# Patient Record
Sex: FEMALE | Race: WHITE | Employment: OTHER | RURAL
[De-identification: names, ages, dates, MRNs, and addresses within clinical notes are randomized per-mention and may not be internally consistent; named-entity substitution may affect disease eponyms.]

---

## 2021-06-17 LAB — MAMMOGRAPHY, EXTERNAL: NORMAL

## 2022-04-14 ENCOUNTER — OFFICE VISIT (OUTPATIENT)
Dept: FAMILY MEDICINE CLINIC | Age: 57
End: 2022-04-14
Payer: OTHER GOVERNMENT

## 2022-04-14 VITALS
HEART RATE: 78 BPM | RESPIRATION RATE: 20 BRPM | SYSTOLIC BLOOD PRESSURE: 132 MMHG | BODY MASS INDEX: 29.09 KG/M2 | TEMPERATURE: 97.8 F | WEIGHT: 181 LBS | HEIGHT: 66 IN | OXYGEN SATURATION: 97 % | DIASTOLIC BLOOD PRESSURE: 78 MMHG

## 2022-04-14 DIAGNOSIS — G89.29 OTHER CHRONIC PAIN: ICD-10-CM

## 2022-04-14 DIAGNOSIS — E03.9 ACQUIRED HYPOTHYROIDISM: ICD-10-CM

## 2022-04-14 DIAGNOSIS — L08.9 INFECTION OF GREAT TOE: ICD-10-CM

## 2022-04-14 DIAGNOSIS — E11.65 TYPE 2 DIABETES MELLITUS WITH HYPERGLYCEMIA, WITHOUT LONG-TERM CURRENT USE OF INSULIN (HCC): Primary | ICD-10-CM

## 2022-04-14 DIAGNOSIS — Z13.220 SCREENING FOR HYPERLIPIDEMIA: ICD-10-CM

## 2022-04-14 DIAGNOSIS — Z11.59 ENCOUNTER FOR HEPATITIS C SCREENING TEST FOR LOW RISK PATIENT: ICD-10-CM

## 2022-04-14 DIAGNOSIS — Z76.89 ESTABLISHING CARE WITH NEW DOCTOR, ENCOUNTER FOR: ICD-10-CM

## 2022-04-14 DIAGNOSIS — E55.9 VITAMIN D DEFICIENCY: ICD-10-CM

## 2022-04-14 PROBLEM — I83.12 VARICOSE VEINS OF LEFT LOWER EXTREMITY WITH INFLAMMATION: Status: ACTIVE | Noted: 2017-11-08

## 2022-04-14 PROBLEM — N39.3 STRESS INCONTINENCE: Status: ACTIVE | Noted: 2019-05-07

## 2022-04-14 PROBLEM — G89.4 CHRONIC PAIN DISORDER: Status: ACTIVE | Noted: 2022-04-14

## 2022-04-14 PROBLEM — L03.031 CELLULITIS OF THIRD TOE OF RIGHT FOOT: Status: ACTIVE | Noted: 2019-08-13

## 2022-04-14 PROBLEM — M95.8 DEFORMITY OF CLAVICLE: Status: ACTIVE | Noted: 2017-10-26

## 2022-04-14 PROBLEM — F11.20 NARCOTIC DEPENDENCE (HCC): Status: ACTIVE | Noted: 2021-04-30

## 2022-04-14 PROBLEM — E78.00 HYPERCHOLESTEROLEMIA: Status: ACTIVE | Noted: 2022-04-14

## 2022-04-14 PROCEDURE — 99204 OFFICE O/P NEW MOD 45 MIN: CPT | Performed by: INTERNAL MEDICINE

## 2022-04-14 RX ORDER — LEVOTHYROXINE SODIUM 75 UG/1
1 TABLET ORAL DAILY
COMMUNITY
Start: 2022-02-19

## 2022-04-14 RX ORDER — NAPROXEN 250 MG/1
250 TABLET ORAL
COMMUNITY

## 2022-04-14 RX ORDER — METFORMIN HYDROCHLORIDE 500 MG/1
1000 TABLET ORAL 2 TIMES DAILY
COMMUNITY
Start: 2021-12-01

## 2022-04-14 RX ORDER — GABAPENTIN 100 MG/1
400 CAPSULE ORAL
COMMUNITY

## 2022-04-14 RX ORDER — TRAZODONE HYDROCHLORIDE 50 MG/1
50 TABLET ORAL AT BEDTIME
COMMUNITY
End: 2022-05-09

## 2022-04-14 RX ORDER — OXYCODONE HYDROCHLORIDE 5 MG/1
10 TABLET ORAL 4 TIMES DAILY
COMMUNITY

## 2022-04-14 RX ORDER — LIDOCAINE 50 MG/G
3 PATCH TOPICAL DAILY
COMMUNITY

## 2022-04-14 RX ORDER — ONDANSETRON 4 MG/1
4 TABLET, FILM COATED ORAL
COMMUNITY

## 2022-04-14 RX ORDER — POLYETHYLENE GLYCOL 3350 17 G/17G
17 POWDER, FOR SOLUTION ORAL
COMMUNITY

## 2022-04-14 RX ORDER — ESTRADIOL 2 MG/1
1 TABLET ORAL DAILY
COMMUNITY
Start: 2022-02-19

## 2022-04-14 RX ORDER — ACETAMINOPHEN 325 MG/1
650 TABLET ORAL
COMMUNITY
End: 2022-05-09

## 2022-04-14 RX ORDER — CYCLOBENZAPRINE HCL 10 MG
TABLET ORAL
COMMUNITY
Start: 2021-10-25

## 2022-04-14 RX ORDER — LISINOPRIL 10 MG/1
1 TABLET ORAL DAILY
COMMUNITY
Start: 2022-02-19

## 2022-04-14 RX ORDER — GABAPENTIN 300 MG/1
300 CAPSULE ORAL
COMMUNITY
End: 2022-05-09

## 2022-04-14 NOTE — PATIENT INSTRUCTIONS
Varicose Veins: Care Instructions  Your Care Instructions  Varicose veins are twisted, enlarged veins near the surface of the skin. They develop most often in the legs and ankles. Some people may be more likely than others to get varicose veins because of aging or hormone changes or because a parent has them. Being overweight or pregnant can make varicose veins worse. Jobs that require standing for long periods of time also can make them worse. Follow-up care is a key part of your treatment and safety. Be sure to make and go to all appointments, and call your doctor if you are having problems. It's also a good idea to know your test results and keep a list of the medicines you take. How can you care for yourself at home? · Wear compression stockings during the day to help relieve symptoms. They improve blood flow and are the main treatment for varicose veins. Talk to your doctor about which ones to get and where to get them. · Prop up your legs at or above the level of your heart when possible. This helps keep the blood from pooling in your lower legs and improves blood flow to the rest of your body. · Avoid sitting and standing for long periods. This puts added stress on your veins. · Get regular exercise, and control your weight. Walk, bicycle, or swim to improve blood flow in your legs. · If you bump your leg so hard that you know it is likely to bruise, prop up your leg and put ice or a cold pack on the area for 10 to 20 minutes at a time. Try to do this every 1 to 2 hours for the next 3 days (when you are awake) or until the swelling goes down. Put a thin cloth between the ice and your skin. · If you cut or scratch the skin over a vein, it may bleed a lot. Prop up your leg and apply firm pressure with a clean bandage over the site of the bleeding. Continue to apply pressure for a full 15 minutes. Do not check sooner to see if the bleeding has stopped.  If the bleeding has not stopped after 15 minutes, apply pressure again for another 15 minutes. You can repeat this up to 3 times for a total of 45 minutes. If you have a blood clot in a varicose vein, you may have tenderness and swelling over the vein. The vein may feel firm. Be sure to call your doctor right away if you have these symptoms. If your doctor has told you how to care for the clot, follow his or her instructions. Care may include the following:  · Prop up your leg and apply heat with a warm, damp cloth or a heating pad set on low (put a towel or cloth between your leg and the heating pad to prevent burns). · Ask your doctor if you can take an over-the-counter pain medicine, such as acetaminophen (Tylenol), ibuprofen (Advil, Motrin), or naproxen (Aleve). Be safe with medicines. Read and follow all instructions on the label. When should you call for help? Call 911 anytime you think you may need emergency care. For example, call if:    · You have sudden chest pain and shortness of breath, or you cough up blood. Call your doctor now or seek immediate medical care if:    · You have signs of a blood clot, such as:  ? Pain in your calf, back of the knee, thigh, or groin. ? Redness and swelling in your leg or groin.     · A varicose vein begins to bleed and you cannot stop it.     · You have a tender lump in your leg.     · You get an open sore. Watch closely for changes in your health, and be sure to contact your doctor if:    · Your varicose vein symptoms do not improve with home treatment. Where can you learn more? Go to http://www.gray.com/  Enter B637 in the search box to learn more about \"Varicose Veins: Care Instructions. \"  Current as of: July 6, 2021               Content Version: 13.2  © 2006-2022 [employer]. Care instructions adapted under license by [employer] (which disclaims liability or warranty for this information).  If you have questions about a medical condition or this instruction, always ask your healthcare professional. [place] [address] any warranty or liability for your use of this information.

## 2022-04-14 NOTE — PROGRESS NOTES
CHIEF COMPLAINT:   Chief Complaint   Patient presents with    New Patient     est care- Dr Herson Britt- no longer take     Pain (Chronic)     pain managment referral for back    Toe Pain     Rt big toe- had surgery Nov 8 hammer toe       IMPRESSION AND PLAN:   Diagnoses and all orders for this visit:    1. Type 2 diabetes mellitus with hyperglycemia, without long-term current use of insulin (HCC)  -     HEMOGLOBIN A1C WITH EAG; Future  -     MICROALBUMIN, UR, RAND W/ MICROALB/CREAT RATIO; Future    2. Acquired hypothyroidism  -     TSH 3RD GENERATION; Future  -     T4 (THYROXINE); Future    3. Screening for hyperlipidemia  -     METABOLIC PANEL, COMPREHENSIVE; Future  -     CBC WITH AUTOMATED DIFF; Future  -     LIPID PANEL; Future    4. Encounter for hepatitis C screening test for low risk patient  -     HEPATITIS C AB; Future    5. Other chronic pain  -     REFERRAL TO PAIN MANAGEMENT    6. Vitamin D deficiency  -     VITAMIN D, 25 HYDROXY; Future    7. Establishing care with new doctor, encounter for    8. Infection of great toe  -     REFERRAL TO ORTHOPEDICS    I have discussed the diagnosis with the patient and the intended treatment plan as seen in the above orders. The patient has received an after-visit summary and questions were answered concerning future plans. Asked to return should symptoms worsen or not improve with treatment. Any pending labs and studies will be relayed to patient when they become available. Pt verbalizes understanding of plan of care and denies further questions or concerns at this time. Follow-up and Dispositions    · Return in about 3 weeks (around 5/5/2022), or if symptoms worsen or fail to improve. · Well woman evaluation. Should have labs done prior. HISTORY OF PRESENT ILLNESS:   57F who presents as a NEW patient to [facility].. She has a h/o chronic back pain and recently had surgery of a Great Toe Hammer toe in November 2021.  She had been under the care of Dr. Ashley Blood who no longer takes . The patient is also managed by Dr. German Ordonez (Pain management) and sees him every 2-months. She does need a referral to continue to see him. He manages her pain medications. The patient also has a h/o T2DM, HTN and HLD. She also recently had an infection of her great right toe and has been seen by Dr. Prince Guerin. She needs a referral for this as well. We reviewed her HM and she will follow up for a well woman visit and to update her HM. PAST MEDICAL HISTORY:  Past Medical History:   Diagnosis Date    Chronic pain     back    Diabetes ([address]     Hypercholesterolemia     Hypertension     Thyroid disease        PAST SURGICAL HISTORY:  Past Surgical History:   Procedure Laterality Date    HX APPENDECTOMY      HX CARPAL TUNNEL RELEASE Bilateral     HX HAMMER TOE REPAIR  11/2021    HX HYSTERECTOMY  1992    HX ORTHOPAEDIC      back    IR CHOLECYSTOSTOMY PERCUTANEOUS         MEDICATIONS:  Current Outpatient Medications   Medication Sig Dispense Refill    acetaminophen (TYLENOL) 325 mg tablet Take 650 mg by mouth every six (6) hours as needed.  cyclobenzaprine (FLEXERIL) 10 mg tablet TAKE 1 TABLET BY MOUTH AT BEDTIME AS NEEDED FOR MUSCLE CRAMPS      empagliflozin (Jardiance) 10 mg tablet 1 Tablet daily.  estradioL (ESTRACE) 2 mg tablet 1 Tablet daily.  gabapentin (NEURONTIN) 100 mg capsule Take 400 mg by mouth nightly.  gabapentin (NEURONTIN) 300 mg capsule Take 300 mg by mouth three (3) times daily as needed.  levothyroxine (SYNTHROID) 75 mcg tablet 1 Tablet daily.  lidocaine (LIDODERM) 5 % 2 Patches by TransDERmal route daily.  lisinopriL (PRINIVIL, ZESTRIL) 10 mg tablet 1 Tablet daily.  metFORMIN (GLUCOPHAGE) 500 mg tablet Take 1,000 mg by mouth two (2) times a day.  naproxen (NAPROSYN) 250 mg tablet Take 220 mg by mouth two (2) times daily as needed.       ondansetron hcl (ZOFRAN) 4 mg tablet Take 4 mg by mouth three (3) times daily as needed.  oxyCODONE IR (ROXICODONE) 5 mg immediate release tablet Take 10 mg by mouth two (2) times a day.  polyethylene glycol (MIRALAX) 17 gram/dose powder Take 17 g by mouth nightly.  traZODone (DESYREL) 50 mg tablet Take 50 mg by mouth At bedtime.  vitamin C-multivitamin-mineral 500 mg chew Take  by mouth daily. ALLERGIES:  Allergies   Allergen Reactions    Codeine Other (comments)     Tachycardia and passed out    Tramadol Itching    Phenazopyridine Unknown (comments)        SOCIAL HISTORY:   Social History     Tobacco Use    Smoking status: Former Smoker     Packs/day: 1.00     Years: 20.00     Pack years: 20.00     Types: Cigarettes     Quit date:      Years since quittin.2    Smokeless tobacco: Never Used   Substance Use Topics    Alcohol use: Not Currently    Drug use: Not Currently       FAMILY HISTORY:   No family history on file. REVIEW OF SYSTEMS:  Review of Systems - Negative except for documented in the HPI. PHYSICAL EXAMINATION:  Visit Vitals  /78 (BP 1 Location: Right arm, BP Patient Position: Sitting, BP Cuff Size: Adult)   Pulse 78   Temp 97.8 °F (36.6 °C) (Temporal)   Resp 20   Ht 5' 6\" (1.676 m)   Wt 181 lb (82.1 kg)   SpO2 97%   BMI 29.21 kg/m²     General appearance: alert, well appearing, and in no distress. Chest: clear to auscultation, no wheezes, rales or rhonchi, symmetric air entry. CVS exam: normal rate, regular rhythm, normal S1, S2, no murmurs, rubs, clicks or gallops. Oropharyngeal exam - mucous membranes moist, pharynx normal without lesions. Exam of extremities: bilateral varicose veins. Mild swelling and tenderness around R-great toe. Previous hammertoe surgery healed scars noted. Skin exam - normal coloration and turgor, no rashes, no suspicious skin lesions noted.       LABORATORY DATA:  Ordered/Pending      Patient Instructions          Varicose Veins: Care Instructions  Your Care Instructions  Varicose veins are twisted, enlarged veins near the surface of the skin. They develop most often in the legs and ankles. Some people may be more likely than others to get varicose veins because of aging or hormone changes or because a parent has them. Being overweight or pregnant can make varicose veins worse. Jobs that require standing for long periods of time also can make them worse. Follow-up care is a key part of your treatment and safety. Be sure to make and go to all appointments, and call your doctor if you are having problems. It's also a good idea to know your test results and keep a list of the medicines you take. How can you care for yourself at home? · Wear compression stockings during the day to help relieve symptoms. They improve blood flow and are the main treatment for varicose veins. Talk to your doctor about which ones to get and where to get them. · Prop up your legs at or above the level of your heart when possible. This helps keep the blood from pooling in your lower legs and improves blood flow to the rest of your body. · Avoid sitting and standing for long periods. This puts added stress on your veins. · Get regular exercise, and control your weight. Walk, bicycle, or swim to improve blood flow in your legs. · If you bump your leg so hard that you know it is likely to bruise, prop up your leg and put ice or a cold pack on the area for 10 to 20 minutes at a time. Try to do this every 1 to 2 hours for the next 3 days (when you are awake) or until the swelling goes down. Put a thin cloth between the ice and your skin. · If you cut or scratch the skin over a vein, it may bleed a lot. Prop up your leg and apply firm pressure with a clean bandage over the site of the bleeding. Continue to apply pressure for a full 15 minutes. Do not check sooner to see if the bleeding has stopped.  If the bleeding has not stopped after 15 minutes, apply pressure again for another 15 minutes. You can repeat this up to 3 times for a total of 45 minutes. If you have a blood clot in a varicose vein, you may have tenderness and swelling over the vein. The vein may feel firm. Be sure to call your doctor right away if you have these symptoms. If your doctor has told you how to care for the clot, follow his or her instructions. Care may include the following:  · Prop up your leg and apply heat with a warm, damp cloth or a heating pad set on low (put a towel or cloth between your leg and the heating pad to prevent burns). · Ask your doctor if you can take an over-the-counter pain medicine, such as acetaminophen (Tylenol), ibuprofen (Advil, Motrin), or naproxen (Aleve). Be safe with medicines. Read and follow all instructions on the label. When should you call for help? Call 911 anytime you think you may need emergency care. For example, call if:    · You have sudden chest pain and shortness of breath, or you cough up blood. Call your doctor now or seek immediate medical care if:    · You have signs of a blood clot, such as:  ? Pain in your calf, back of the knee, thigh, or groin. ? Redness and swelling in your leg or groin.     · A varicose vein begins to bleed and you cannot stop it.     · You have a tender lump in your leg.     · You get an open sore. Watch closely for changes in your health, and be sure to contact your doctor if:    · Your varicose vein symptoms do not improve with home treatment. Where can you learn more? Go to http://www.gray.com/  Enter B637 in the search box to learn more about \"Varicose Veins: Care Instructions. \"  Current as of: July 6, 2021               Content Version: 13.2  © 2006-2022 [employer]. Care instructions adapted under license by [employer] (which disclaims liability or warranty for this information).  If you have questions about a medical condition or this instruction, always ask your healthcare professional. [address] any warranty or liability for your use of this information.

## 2022-04-14 NOTE — PROGRESS NOTES
Identified pt with two pt identifiers(name and ). Chief Complaint   Patient presents with    New Patient     est care- Dr Abhishek Gaston- no longer take     Pain (Chronic)     pain managment referral for back    Toe Pain     Rt big toe- had surgery  hammer toe        Health Maintenance Due   Topic    Hepatitis C Screening     Depression Screen     COVID-19 Vaccine (1)    Cervical cancer screen     Lipid Screen     Colorectal Cancer Screening Combo     Shingrix Vaccine Age 50> (1 of 2)    Low dose CT lung screening     Breast Cancer Screen Mammogram        Wt Readings from Last 3 Encounters:   22 181 lb (82.1 kg)     Temp Readings from Last 3 Encounters:   22 97.8 °F (36.6 °C) (Temporal)     BP Readings from Last 3 Encounters:   22 132/78     Pulse Readings from Last 3 Encounters:   22 78         Learning Assessment:  :     Learning Assessment 2022   PRIMARY LEARNER Patient   HIGHEST LEVEL OF EDUCATION - PRIMARY LEARNER  GRADUATED HIGH SCHOOL OR GED   BARRIERS PRIMARY LEARNER NONE   CO-LEARNER CAREGIVER No   PRIMARY LANGUAGE ENGLISH    NEED No   LEARNER PREFERENCE PRIMARY DEMONSTRATION     LISTENING     READING     PICTURES   ANSWERED BY Patient   RELATIONSHIP SELF       Depression Screening:  :     3 most recent PHQ Screens 2022   Little interest or pleasure in doing things Not at all   Feeling down, depressed, irritable, or hopeless Not at all   Total Score PHQ 2 0       Fall Risk Assessment:  :     No flowsheet data found. Abuse Screening:  :     Abuse Screening Questionnaire 2022   Do you ever feel afraid of your partner? N   Are you in a relationship with someone who physically or mentally threatens you? N   Is it safe for you to go home?  Y       Coordination of Care Questionnaire:  :     1) Have you been to an emergency room, urgent care clinic since your last visit? no   Hospitalized since your last visit? no            2) Have you seen or consulted any other health care providers outside of [address] [place] [address] since your last visit? yes Dr Yahaira Dupont- pain management every 2 months    3) Do you have an Advance Directive on file? No    Are you interested in receiving information about Advance Directives? no    4. For patients aged 39-70: Has the patient had a colonoscopy / FIT/ Cologuard? No      If the patient is female:    5. For patients aged 41-77: Has the patient had a mammogram within the past 2 years? YES at [facility]       6. For patients aged 21-65: Has the patient had a pap smear?  No  Doesn't need anymore

## 2022-04-18 ENCOUNTER — LAB ONLY (OUTPATIENT)
Dept: FAMILY MEDICINE CLINIC | Age: 57
End: 2022-04-18

## 2022-04-18 DIAGNOSIS — E55.9 VITAMIN D DEFICIENCY: ICD-10-CM

## 2022-04-18 DIAGNOSIS — E03.9 ACQUIRED HYPOTHYROIDISM: ICD-10-CM

## 2022-04-18 DIAGNOSIS — E11.65 TYPE 2 DIABETES MELLITUS WITH HYPERGLYCEMIA, WITHOUT LONG-TERM CURRENT USE OF INSULIN (HCC): ICD-10-CM

## 2022-04-18 DIAGNOSIS — Z11.59 ENCOUNTER FOR HEPATITIS C SCREENING TEST FOR LOW RISK PATIENT: ICD-10-CM

## 2022-04-18 DIAGNOSIS — Z13.220 SCREENING FOR HYPERLIPIDEMIA: ICD-10-CM

## 2022-04-19 LAB
25(OH)D3 SERPL-MCNC: 37.2 NG/ML (ref 30–100)
ALBUMIN SERPL-MCNC: 4 G/DL (ref 3.5–5)
ALBUMIN/GLOB SERPL: 1 {RATIO} (ref 1.1–2.2)
ALP SERPL-CCNC: 88 U/L (ref 45–117)
ALT SERPL-CCNC: 30 U/L (ref 12–78)
ANION GAP SERPL CALC-SCNC: 3 MMOL/L (ref 5–15)
AST SERPL-CCNC: 17 U/L (ref 15–37)
BASOPHILS # BLD: 0 K/UL (ref 0–0.1)
BASOPHILS NFR BLD: 1 % (ref 0–1)
BILIRUB SERPL-MCNC: 0.4 MG/DL (ref 0.2–1)
BUN SERPL-MCNC: 16 MG/DL (ref 6–20)
BUN/CREAT SERPL: 21 (ref 12–20)
CALCIUM SERPL-MCNC: 9.3 MG/DL (ref 8.5–10.1)
CHLORIDE SERPL-SCNC: 101 MMOL/L (ref 97–108)
CHOLEST SERPL-MCNC: 197 MG/DL
CO2 SERPL-SCNC: 30 MMOL/L (ref 21–32)
CREAT SERPL-MCNC: 0.76 MG/DL (ref 0.55–1.02)
CREAT UR-MCNC: 58.9 MG/DL
DIFFERENTIAL METHOD BLD: ABNORMAL
EOSINOPHIL # BLD: 0.1 K/UL (ref 0–0.4)
EOSINOPHIL NFR BLD: 2 % (ref 0–7)
ERYTHROCYTE [DISTWIDTH] IN BLOOD BY AUTOMATED COUNT: 12.5 % (ref 11.5–14.5)
EST. AVERAGE GLUCOSE BLD GHB EST-MCNC: 143 MG/DL
GLOBULIN SER CALC-MCNC: 3.9 G/DL (ref 2–4)
GLUCOSE SERPL-MCNC: 147 MG/DL (ref 65–100)
HBA1C MFR BLD: 6.6 % (ref 4–5.6)
HCT VFR BLD AUTO: 47.1 % (ref 35–47)
HCV AB SERPL QL IA: NONREACTIVE
HDLC SERPL-MCNC: 74 MG/DL
HDLC SERPL: 2.7 {RATIO} (ref 0–5)
HGB BLD-MCNC: 14.8 G/DL (ref 11.5–16)
IMM GRANULOCYTES # BLD AUTO: 0 K/UL (ref 0–0.04)
IMM GRANULOCYTES NFR BLD AUTO: 0 % (ref 0–0.5)
LDLC SERPL CALC-MCNC: 96 MG/DL (ref 0–100)
LYMPHOCYTES # BLD: 1.9 K/UL (ref 0.8–3.5)
LYMPHOCYTES NFR BLD: 32 % (ref 12–49)
MCH RBC QN AUTO: 31.6 PG (ref 26–34)
MCHC RBC AUTO-ENTMCNC: 31.4 G/DL (ref 30–36.5)
MCV RBC AUTO: 100.6 FL (ref 80–99)
MICROALBUMIN UR-MCNC: <0.5 MG/DL
MICROALBUMIN/CREAT UR-RTO: <8 MG/G (ref 0–30)
MONOCYTES # BLD: 0.4 K/UL (ref 0–1)
MONOCYTES NFR BLD: 7 % (ref 5–13)
NEUTS SEG # BLD: 3.4 K/UL (ref 1.8–8)
NEUTS SEG NFR BLD: 58 % (ref 32–75)
NRBC # BLD: 0 K/UL (ref 0–0.01)
NRBC BLD-RTO: 0 PER 100 WBC
PLATELET # BLD AUTO: 308 K/UL (ref 150–400)
PMV BLD AUTO: 9.7 FL (ref 8.9–12.9)
POTASSIUM SERPL-SCNC: 4.7 MMOL/L (ref 3.5–5.1)
PROT SERPL-MCNC: 7.9 G/DL (ref 6.4–8.2)
RBC # BLD AUTO: 4.68 M/UL (ref 3.8–5.2)
SODIUM SERPL-SCNC: 134 MMOL/L (ref 136–145)
T4 SERPL-MCNC: 14.9 UG/DL (ref 4.8–13.9)
TRIGL SERPL-MCNC: 135 MG/DL (ref ?–150)
TSH SERPL DL<=0.05 MIU/L-ACNC: 1.29 UIU/ML (ref 0.36–3.74)
VLDLC SERPL CALC-MCNC: 27 MG/DL
WBC # BLD AUTO: 5.8 K/UL (ref 3.6–11)

## 2022-05-09 ENCOUNTER — OFFICE VISIT (OUTPATIENT)
Dept: FAMILY MEDICINE CLINIC | Age: 57
End: 2022-05-09
Payer: OTHER GOVERNMENT

## 2022-05-09 VITALS
WEIGHT: 182 LBS | OXYGEN SATURATION: 96 % | HEART RATE: 86 BPM | BODY MASS INDEX: 29.25 KG/M2 | TEMPERATURE: 98 F | DIASTOLIC BLOOD PRESSURE: 74 MMHG | SYSTOLIC BLOOD PRESSURE: 128 MMHG | HEIGHT: 66 IN | RESPIRATION RATE: 20 BRPM

## 2022-05-09 DIAGNOSIS — E11.65 TYPE 2 DIABETES MELLITUS WITH HYPERGLYCEMIA, WITHOUT LONG-TERM CURRENT USE OF INSULIN (HCC): Primary | ICD-10-CM

## 2022-05-09 DIAGNOSIS — E55.9 VITAMIN D DEFICIENCY: ICD-10-CM

## 2022-05-09 DIAGNOSIS — I83.93 VARICOSE VEINS OF BOTH LOWER EXTREMITIES, UNSPECIFIED WHETHER COMPLICATED: ICD-10-CM

## 2022-05-09 PROCEDURE — 99213 OFFICE O/P EST LOW 20 MIN: CPT | Performed by: INTERNAL MEDICINE

## 2022-05-09 PROCEDURE — 3044F HG A1C LEVEL LT 7.0%: CPT | Performed by: INTERNAL MEDICINE

## 2022-05-09 RX ORDER — ERGOCALCIFEROL 1.25 MG/1
50000 CAPSULE ORAL
Qty: 12 CAPSULE | Refills: 1 | Status: SHIPPED | OUTPATIENT
Start: 2022-05-09 | End: 2022-08-07

## 2022-05-09 NOTE — PROGRESS NOTES
Identified pt with two pt identifiers(name and ). Chief Complaint   Patient presents with    Laceration     go over results        Health Maintenance Due   Topic    Pneumococcal 0-64 years (1 - PCV)    Eye Exam Retinal or Dilated     Colorectal Cancer Screening Combo     Shingrix Vaccine Age 50> (1 of 2)    Low dose CT lung screening     Breast Cancer Screen Mammogram     COVID-19 Vaccine (3 - Booster for Moderna series)       Wt Readings from Last 3 Encounters:   22 182 lb (82.6 kg)   22 181 lb (82.1 kg)     Temp Readings from Last 3 Encounters:   22 98 °F (36.7 °C) (Temporal)   22 97.8 °F (36.6 °C) (Temporal)     BP Readings from Last 3 Encounters:   22 128/74   22 132/78     Pulse Readings from Last 3 Encounters:   22 86   22 78         Learning Assessment:  :     Learning Assessment 2022   PRIMARY LEARNER Patient   HIGHEST LEVEL OF EDUCATION - PRIMARY LEARNER  GRADUATED HIGH SCHOOL OR GED   BARRIERS PRIMARY LEARNER NONE   CO-LEARNER CAREGIVER No   PRIMARY LANGUAGE ENGLISH    NEED No   LEARNER PREFERENCE PRIMARY DEMONSTRATION     LISTENING     READING     PICTURES   ANSWERED BY Patient   RELATIONSHIP SELF       Depression Screening:  :     3 most recent PHQ Screens 2022   Little interest or pleasure in doing things Not at all   Feeling down, depressed, irritable, or hopeless Not at all   Total Score PHQ 2 0       Fall Risk Assessment:  :     No flowsheet data found. Abuse Screening:  :     Abuse Screening Questionnaire 2022   Do you ever feel afraid of your partner? N N   Are you in a relationship with someone who physically or mentally threatens you? N N   Is it safe for you to go home?  Y Y       Coordination of Care Questionnaire:  :     1) Have you been to an emergency room, urgent care clinic since your last visit? no   Hospitalized since your last visit? no             2) Have you seen or consulted any other health care providers outside of [address] [place] [address] since your last visit? no  (Include any pap smears or colon screenings in this section.)    3) Do you have an Advance Directive on file? no  Are you interested in receiving information about Advance Directives? no    4. For patients aged 39-70: Has the patient had a colonoscopy / FIT/ Cologuard? No      If the patient is female:    5. For patients aged 41-77: Has the patient had a mammogram within the past 2 years? No      6. For patients aged 21-65: Has the patient had a pap smear?  Yes - no Care Gap present

## 2022-05-09 NOTE — PROGRESS NOTES
-- DO NOT REPLY / DO NOT REPLY ALL --  -- Message is from the [facility]--    Order Request  X Ray of Right side o body     Message / reason: Patient thinks she possibly can have blood clots     Insurance type: Blue cross   Payor:  [employer] / Plan:  BE HI  / Product Type: [facility]    Preferred Delivery Method   Call when ready for pickup - phone number to notify: [phone]     Caller Information       Type Contact Phone    06/02/2021 12:11 PM CDT Phone (Incoming) Gay Kline (Self) [phone] (M)          Alternative phone number: [phone]    Turnaround time given to caller:   \"This message will be sent to [state Provider's name]. The clinical team will fulfill your request as soon as they review your message.\"   Chief Complaint   Patient presents with    Laceration     go over results       Assessment/ Plan:   Diagnoses and all orders for this visit:    1. Type 2 diabetes mellitus with hyperglycemia, without long-term current use of insulin ([facility])  Diabetic Report Card  Diabetic Goals:  HgA1C <7,  LDL cholesterol <100, Blood pressure <140/80. Lab Results   Component Value Date/Time    Hemoglobin A1c 6.6 (H) 04/18/2022 08:39 AM     Lab Results   Component Value Date/Time    Cholesterol, total 197 04/18/2022 08:39 AM    HDL Cholesterol 74 04/18/2022 08:39 AM    LDL, calculated 96 04/18/2022 08:39 AM    VLDL, calculated 27 04/18/2022 08:39 AM    Triglyceride 135 04/18/2022 08:39 AM    CHOL/HDL Ratio 2.7 04/18/2022 08:39 AM     Lab Results   Component Value Date/Time    Hemoglobin A1c 6.6 (H) 04/18/2022 08:39 AM     I also recommended yearly eye exams and daily foot care. 2. Varicose veins of both lower extremities, unspecified whether complicated  -     REFERRAL TO VASCULAR SURGERY    3. Vitamin D deficiency  -     ergocalciferol (ERGOCALCIFEROL) 1,250 mcg (50,000 unit) capsule; Take 1 Capsule by mouth every seven (7) days for 90 days. I have discussed the diagnosis with the patient and the intended treatment plan as seen in the above orders. The patient has received an after-visit summary and questions were answered concerning future plans. Asked to return should symptoms worsen or not improve with treatment. Any pending labs and studies will be relayed to patient when they become available. Pt verbalizes understanding of plan of care and denies further questions or concerns at this time. Follow-up and Dispositions    · Return if symptoms worsen or fail to improve. Subjective:   Carmen Ya is a 61 y.o. female who presents for follow up and discussion of her labs. She has a healing foot laceration after seeing podiatry.  She has a h/o chronic back pain and recently had surgery of a [place] Toe Hammer toe in November 2021. She had been under the care of Dr. Santi Pozo who no longer takes .      The patient is also managed by Dr. Rossy Carbajal (Pain management) and sees him every 2-month. She is concerned about varicose veins in her legs and \"wants something done about them. \"  We discussed option such as compression stockings, keeping feet elevated. Will refer to vascular. Patient feels like other options have not worked for her. HISTORICAL  PMH, PSH, FHX, SOCHX, ALLERGIES and MES were reviewed and updated today. Review of Systems  Review of Systems   Constitutional: Negative. HENT: Negative. Eyes: Negative. Respiratory: Negative. Cardiovascular: Negative. Gastrointestinal: Negative. Genitourinary: Negative. Musculoskeletal: Negative. Skin: Negative. Neurological: Negative. Endo/Heme/Allergies: Negative. Psychiatric/Behavioral: Negative. All other systems reviewed and are negative. Objective:     Vitals:    05/09/22 1058   BP: 128/74   Pulse: 86   Resp: 20   Temp: 98 °F (36.7 °C)   TempSrc: Temporal   SpO2: 96%   Weight: 182 lb (82.6 kg)   Height: 5' 6\" (1.676 m)       Physical Exam  Constitutional:       Appearance: Normal appearance. HENT:      Head: Normocephalic and atraumatic. Cardiovascular:      Rate and Rhythm: Normal rate and regular rhythm. Pulses: Normal pulses. Heart sounds: Normal heart sounds. Pulmonary:      Effort: Pulmonary effort is normal.      Breath sounds: Normal breath sounds. Musculoskeletal:      Cervical back: Normal range of motion and neck supple. Comments: Bilateral varicose and spider damico veins. Skin:     General: Skin is warm. Capillary Refill: Capillary refill takes less than 2 seconds. Neurological:      General: No focal deficit present. Mental Status: She is alert. Mental status is at baseline.    Psychiatric:         Mood and Affect: Mood normal.         Thought Content: Thought content normal.       Chriss Castillo MD  [facility]   02/21/22 10:06 AM      Patient Instructions       Learning About Vitamin D  Why is it important to get enough vitamin D? Your body needs vitamin D to absorb calcium. Calcium keeps your bones and muscles, including your heart, healthy and strong. If your muscles don't get enough calcium, they can cramp, hurt, or feel weak. You may have long-term (chronic) muscle aches and pains. If you don't get enough vitamin D throughout life, you have an increased chance of having thin and brittle bones (osteoporosis) in your later years. Children who don't get enough vitamin D may not grow as much as others their age. They also have a chance of getting a rare disease called rickets. It causes weak bones. Vitamin D and calcium are added to many foods. And your body uses sunshine to make its own vitamin D. How much vitamin D do you need? The recommended dietary allowance (RDA) for vitamin D is 600 IU (international units) every day for people ages 3 through 79. Adults 71 and older need 800 IU every day. How can you get more vitamin D? Foods that contain vitamin D include:  · [place], tuna, and mackerel. These are some of the best foods to eat when you need to get more vitamin D.  · Cheese, egg yolks, and beef liver. These foods have vitamin D in small amounts. · Milk, soy drinks, orange juice, yogurt, margarine, and some kinds of cereal have vitamin D added to them. Some people don't make vitamin D as well as others. They may have to take extra care in getting enough vitamin D. Things that reduce how much vitamin D your body makes include:  · Having dark skin. · Age, especially if you are older than 72. · Digestive problems, such as Crohn's or celiac disease. · Liver and kidney disease. Some people who do not get enough vitamin D may need supplements.   Are there any risks from taking vitamin D?  · Too much vitamin D:  ? Can damage your kidneys. ? Can cause nausea and vomiting, constipation, and weakness. ? Raises the amount of calcium in your blood. If this happens, you can get confused or have an irregular heart rhythm. · Vitamin D may interact with other medicines. Tell your doctor about all of the medicines you take, including over-the-counter drugs, herbs, and pills. Tell your doctor about all of your current medical problems. Where can you learn more? Go to http://www.koo.com/  Enter N914 in the search box to learn more about \"Learning About Vitamin D.\"  Current as of: September 8, 2021               Content Version: 13.2  © 0977-1246 [employer]. Care instructions adapted under license by [employer] (which disclaims liability or warranty for this information). If you have questions about a medical condition or this instruction, always ask your healthcare professional. [address] any warranty or liability for your use of this information. Calcium Content of Selected Foods     Dairy and Soy Amount Calcium (mg)   Milk (skim, low fat, whole) 1 cup 300   Buttermilk 1 cup 300   Cottage Cheese . 5 cup 65   Ice Cream or Ice Milk . 5 cup 100   Sour Cream, cultured 1 cup 250   Soy Milk, calcium fortified 1 cup 200 to 400   Yogurt 1 cup 450   Yogurt drink 12 oz 300   [place] Instant Breakfast 1 packet 250   Hot [place], calcium fortified 1 packet 320   Nonfat dry milk powder 5 Tbsp 300   Brie Cheese 1 oz 50   Hard Cheese (cheddar, marcio) 1 oz 200   Mozzarella 1 oz 200   Parmesan Cheese 1 Tbsp 70   Swiss or Gruyere 1 oz 270      Vegetables   [place] squash, cooked 1 cup 90   Arugula, raw 1 cup 125   Bok Elias, raw 1 cup 40   Broccoli, cooked 1 cup 180   Chard or Okra, cooked 1 cup 100   Chicory (curly endive), raw 1 cup 40   [place] greens 1 cup 50   Corn, brine packed 1 cup 10   Dandelion greens, raw 1 cup 80   Kale, raw 1 cup 55   Kelp or Kombe 1 cup 60   Mustard greens 1 cup 40   Spinach, cooked 1 cup 240   Turnip greens, raw 1 cup 80      Fruits   Figs, dried, uncooked 1 cup 300   Kiwi, raw 1 cup 50   Orange juice, calcium fortified 8 oz 300   Orange juice, from concentrate 1 cup 20      Legumes   Garbanzo Beans, cooked 1 cup 80   Legumes, general, cooked . 5 cup 15 to 50   Sneed Beans, cooked 1 cup 75   Soybeans, boiled . 5 cup 100   Temphe . 5 cup 75   Tofu, firm, calcium set 4 oz 250 to 750   Tofu, soft regular 4 oz 120 to 390   White Beans, cooked . 5 cup 70      Grains   Cereals (calcium fortified) . 5 to 1 cup 250 to 1000   Amaranth, cooked . 5 cup 135   Bread, calcium fortified 1 slice 101 to 711   Brown rice, long grain, raw 1 cup 50   Oatmeal, instant 1 package 100 to 150   Tortillas, corn 2 85      Nuts and Seeds   Almonds, toasted unblanched 1 oz 80   Sesame seeds, whole roasted 1 oz 280   Sesame tahini 1 oz (2 Tbsp) 130   Sunflower seeds, dried 1 oz 50      Fish   Mackerel, canned 3 oz 250   [place], canned, with bones 3 oz 170 to 210   Sardines 3 oz 370      Other   Molasses, blackstrap 1 Tbsp 135   * When range is given, calcium content varies by product. * The calcium content of plant foods is varied. Most vegetables, legumes, nuts, seeds, and dried fruit contain some calcium. Listed are selected significant sources of well-absorbed calcium. References:   · USDA database, Handbook 8 palm program   · Rahul and Restorationist      How Much Do You Need? Age Calcium (mg)   3- 1year old 500 mg   3- 6year old 800 mg   5- 25year old 1300 mg   23- 48year old 1000 mg   46- 79year old 1200 mg   > 79year old 1200 mg       Diabetic Report Card  Diabetic Goals:  HgA1C <7,  LDL cholesterol <100, Blood pressure <140/80.       Lab Results   Component Value Date/Time    Hemoglobin A1c 6.6 (H) 04/18/2022 08:39 AM     Lab Results   Component Value Date/Time    Cholesterol, total 197 04/18/2022 08:39 AM    HDL Cholesterol 74 04/18/2022 08:39 AM    LDL, calculated 96 04/18/2022 08:39 AM    VLDL, calculated 27 04/18/2022 08:39 AM    Triglyceride 135 04/18/2022 08:39 AM    CHOL/HDL Ratio 2.7 04/18/2022 08:39 AM     Lab Results   Component Value Date/Time    Microalbumin/Creat ratio (mg/g creat) <8 04/18/2022 08:39 AM    Microalbumin,urine random <0.50 04/18/2022 08:39 AM     I also recommended yearly eye exams and daily foot care.

## 2022-05-09 NOTE — PATIENT INSTRUCTIONS
Learning About Vitamin D  Why is it important to get enough vitamin D? Your body needs vitamin D to absorb calcium. Calcium keeps your bones and muscles, including your heart, healthy and strong. If your muscles don't get enough calcium, they can cramp, hurt, or feel weak. You may have long-term (chronic) muscle aches and pains. If you don't get enough vitamin D throughout life, you have an increased chance of having thin and brittle bones (osteoporosis) in your later years. Children who don't get enough vitamin D may not grow as much as others their age. They also have a chance of getting a rare disease called rickets. It causes weak bones. Vitamin D and calcium are added to many foods. And your body uses sunshine to make its own vitamin D. How much vitamin D do you need? The recommended dietary allowance (RDA) for vitamin D is 600 IU (international units) every day for people ages 3 through 79. Adults 71 and older need 800 IU every day. How can you get more vitamin D? Foods that contain vitamin D include:  · [place], tuna, and mackerel. These are some of the best foods to eat when you need to get more vitamin D.  · Cheese, egg yolks, and beef liver. These foods have vitamin D in small amounts. · Milk, soy drinks, orange juice, yogurt, margarine, and some kinds of cereal have vitamin D added to them. Some people don't make vitamin D as well as others. They may have to take extra care in getting enough vitamin D. Things that reduce how much vitamin D your body makes include:  · Having dark skin. · Age, especially if you are older than 72. · Digestive problems, such as Crohn's or celiac disease. · Liver and kidney disease. Some people who do not get enough vitamin D may need supplements. Are there any risks from taking vitamin D?  · Too much vitamin D:  ? Can damage your kidneys. ? Can cause nausea and vomiting, constipation, and weakness. ? Raises the amount of calcium in your blood.  If this happens, you can get confused or have an irregular heart rhythm. · Vitamin D may interact with other medicines. Tell your doctor about all of the medicines you take, including over-the-counter drugs, herbs, and pills. Tell your doctor about all of your current medical problems. Where can you learn more? Go to http://www.koo.com/  Enter Y930 in the search box to learn more about \"Learning About Vitamin D.\"  Current as of: September 8, 2021               Content Version: 13.2  © 2006-2022 [employer]. Care instructions adapted under license by [employer] (which disclaims liability or warranty for this information). If you have questions about a medical condition or this instruction, always ask your healthcare professional. [address] any warranty or liability for your use of this information. Calcium Content of Selected Foods     Dairy and Soy Amount Calcium (mg)   Milk (skim, low fat, whole) 1 cup 300   Buttermilk 1 cup 300   Cottage Cheese . 5 cup 65   Ice Cream or Ice Milk . 5 cup 100   Sour Cream, cultured 1 cup 250   Soy Milk, calcium fortified 1 cup 200 to 400   Yogurt 1 cup 450   Yogurt drink 12 oz 300   [place] Instant Breakfast 1 packet 250   Hot [place], calcium fortified 1 packet 320   Nonfat dry milk powder 5 Tbsp 300   Brie Cheese 1 oz 50   Hard Cheese (cheddar, marcio) 1 oz 200   Mozzarella 1 oz 200   Parmesan Cheese 1 Tbsp 70   Swiss or Gruyere 1 oz 270      Vegetables   [place] squash, cooked 1 cup 90   Arugula, raw 1 cup 125   Bok Elias, raw 1 cup 40   Broccoli, cooked 1 cup 180   Chard or Okra, cooked 1 cup 100   Chicory (curly endive), raw 1 cup 40   [place] greens 1 cup 50   Corn, brine packed 1 cup 10   Dandelion greens, raw 1 cup 80   Kale, raw 1 cup 55   Kelp or Kombe 1 cup 60   Mustard greens 1 cup 40   Spinach, cooked 1 cup 240   Turnip greens, raw 1 cup 80      Fruits   Figs, dried, uncooked 1 cup 300   Kiwi, raw 1 cup 50   Orange juice, calcium fortified 8 oz 300   Orange juice, from concentrate 1 cup 20      Legumes   Garbanzo Beans, cooked 1 cup 80   Legumes, general, cooked . 5 cup 15 to 50   Sneed Beans, cooked 1 cup 75   Soybeans, boiled . 5 cup 100   Temphe . 5 cup 75   Tofu, firm, calcium set 4 oz 250 to 750   Tofu, soft regular 4 oz 120 to 390   White Beans, cooked . 5 cup 70      Grains   Cereals (calcium fortified) . 5 to 1 cup 250 to 1000   Amaranth, cooked . 5 cup 135   Bread, calcium fortified 1 slice 318 to 858   Brown rice, long grain, raw 1 cup 50   Oatmeal, instant 1 package 100 to 150   Tortillas, corn 2 85      Nuts and Seeds   Almonds, toasted unblanched 1 oz 80   Sesame seeds, whole roasted 1 oz 280   Sesame tahini 1 oz (2 Tbsp) 130   Sunflower seeds, dried 1 oz 50      Fish   Mackerel, canned 3 oz 250   [place], canned, with bones 3 oz 170 to 210   Sardines 3 oz 370      Other   Molasses, blackstrap 1 Tbsp 135   * When range is given, calcium content varies by product. * The calcium content of plant foods is varied. Most vegetables, legumes, nuts, seeds, and dried fruit contain some calcium. Listed are selected significant sources of well-absorbed calcium. References:   · USDA database, Handbook 8 palm program   · Rahul and Pentecostal      How Much Do You Need? Age Calcium (mg)   3- 1year old 500 mg   3- 6year old 800 mg   5- 25year old 1300 mg   23- 48year old 1000 mg   46- 79year old 1200 mg   > 79year old 1200 mg       Diabetic Report Card  Diabetic Goals:  HgA1C <7,  LDL cholesterol <100, Blood pressure <140/80.       Lab Results   Component Value Date/Time    Hemoglobin A1c 6.6 (H) 04/18/2022 08:39 AM     Lab Results   Component Value Date/Time    Cholesterol, total 197 04/18/2022 08:39 AM    HDL Cholesterol 74 04/18/2022 08:39 AM    LDL, calculated 96 04/18/2022 08:39 AM    VLDL, calculated 27 04/18/2022 08:39 AM    Triglyceride 135 04/18/2022 08:39 AM    CHOL/HDL Ratio 2.7 04/18/2022 08:39 AM Lab Results   Component Value Date/Time    Microalbumin/Creat ratio (mg/g creat) <8 04/18/2022 08:39 AM    Microalbumin,urine random <0.50 04/18/2022 08:39 AM     I also recommended yearly eye exams and daily foot care.

## 2022-05-10 ENCOUNTER — PATIENT MESSAGE (OUTPATIENT)
Dept: FAMILY MEDICINE CLINIC | Age: 57
End: 2022-05-10

## 2022-05-10 NOTE — TELEPHONE ENCOUNTER
From: Radha Laureano  To: Sheryl Donovan MD  Sent: 5/10/2022 10:08 AM EDT  Subject: Jaundice 10 mg 1 per day    Hi Dr Willy Seth,   Could you please send a new prescription   to [facility] for Jaundice 10 mg once a day? I am hoping I will not run out before receiving my new prescription, if I start getting close can I cut the pill in half to continue atleast 5 mg everyday? I didn't request a refill until my visit with you so I am so sorry I didn't realize the last one .    Also I have been calling all morning but the message on voice mail   says your office is closed. :/  Thank you so much for your assistance!!

## 2022-08-10 ENCOUNTER — OFFICE VISIT (OUTPATIENT)
Dept: FAMILY MEDICINE CLINIC | Age: 57
End: 2022-08-10
Payer: OTHER GOVERNMENT

## 2022-08-10 VITALS
HEIGHT: 66 IN | SYSTOLIC BLOOD PRESSURE: 136 MMHG | WEIGHT: 178 LBS | BODY MASS INDEX: 28.61 KG/M2 | OXYGEN SATURATION: 98 % | RESPIRATION RATE: 16 BRPM | HEART RATE: 80 BPM | DIASTOLIC BLOOD PRESSURE: 70 MMHG | TEMPERATURE: 97.8 F

## 2022-08-10 DIAGNOSIS — M25.561 ACUTE PAIN OF BOTH KNEES: ICD-10-CM

## 2022-08-10 DIAGNOSIS — S90.411A INFECTED ABRASION OF GREAT TOE OF RIGHT FOOT, INITIAL ENCOUNTER: Primary | ICD-10-CM

## 2022-08-10 DIAGNOSIS — L08.9 INFECTED ABRASION OF GREAT TOE OF RIGHT FOOT, INITIAL ENCOUNTER: Primary | ICD-10-CM

## 2022-08-10 DIAGNOSIS — M79.671 RIGHT FOOT PAIN: ICD-10-CM

## 2022-08-10 DIAGNOSIS — M25.562 ACUTE PAIN OF BOTH KNEES: ICD-10-CM

## 2022-08-10 PROCEDURE — 99213 OFFICE O/P EST LOW 20 MIN: CPT | Performed by: NURSE PRACTITIONER

## 2022-08-10 RX ORDER — CEPHALEXIN 500 MG/1
500 CAPSULE ORAL 3 TIMES DAILY
Qty: 30 CAPSULE | Refills: 0 | Status: SHIPPED | OUTPATIENT
Start: 2022-08-10 | End: 2022-08-20

## 2022-08-10 RX ORDER — DICLOFENAC SODIUM 10 MG/G
2 GEL TOPICAL 4 TIMES DAILY
Qty: 50 G | Refills: 2 | Status: SHIPPED | OUTPATIENT
Start: 2022-08-10 | End: 2022-08-24

## 2022-08-10 NOTE — PROGRESS NOTES
HISTORY OF PRESENT ILLNESS  Poncho Rios is a 61 y.o. female. HPI  Pt presents with \"right foot pain and infection, and knee pain\"  Pt states that she needs to find a new ortho that specializes in feet. She has had multiple surgeries on right foot, and she is needing someone in the area. In addition, she cut the bottom of her right great toe while at the beach in June  She states that she has been trying to clear the infection on her own, but it will not clear. In addition, they got a new bed and since then, she has been dealing with knee pain. She states that her back pain has much improved with the bed, but it started causing her knee pain  She has recently purchased pillows and knee pillows to aid in positioning, but is wondering if there is anything that can be prescribed for the pain? Review of Systems   Constitutional:  Negative for fever. Musculoskeletal:  Positive for joint pain. Physical Exam  Constitutional:       Appearance: Normal appearance. Cardiovascular:      Rate and Rhythm: Normal rate and regular rhythm. Heart sounds: Normal heart sounds. Pulmonary:      Effort: Pulmonary effort is normal.      Breath sounds: Normal breath sounds. Musculoskeletal:      Right knee: Swelling present. Left knee: Swelling present. Feet:    Neurological:      Mental Status: She is alert. Psychiatric:         Mood and Affect: Mood normal.         Behavior: Behavior normal.       ASSESSMENT and PLAN    ICD-10-CM ICD-9-CM    1. Infected abrasion of great toe of right foot, initial encounter  S90.411A 917.1 cephALEXin (KEFLEX) 500 mg capsule    L08.9        2. Right foot pain  M79.671 729.5 REFERRAL TO ORTHOPEDICS      3. Acute pain of both knees  M25.561 338.19 diclofenac (VOLTAREN) 1 % gel    M25.562 719.46         Educated about taking medication as prescribed, to clear infection    Can try diclofenac gel for pain.     Pt informed to return to office with worsening of symptoms, or PRN with any questions or concerns. Pt verbalizes understanding of plan of care and denies further questions or concerns at this time.

## 2022-08-10 NOTE — PROGRESS NOTES
Identified pt with two pt identifiers(name and ). Chief Complaint   Patient presents with    Nail Problem     R foot infection    Knee Pain     Both knees have pain and are swelling        Health Maintenance Due   Topic    Pneumococcal 0-64 years (1 - PCV)    Eye Exam Retinal or Dilated     Colorectal Cancer Screening Combo     Shingrix Vaccine Age 50> (1 of 2)    Low dose CT lung screening     COVID-19 Vaccine (3 - Booster for Moderna series)       Wt Readings from Last 3 Encounters:   22 182 lb (82.6 kg)   22 181 lb (82.1 kg)     Temp Readings from Last 3 Encounters:   22 98 °F (36.7 °C) (Temporal)   22 97.8 °F (36.6 °C) (Temporal)     BP Readings from Last 3 Encounters:   22 128/74   22 132/78     Pulse Readings from Last 3 Encounters:   22 86   22 78         Learning Assessment:  :     Learning Assessment 2022   PRIMARY LEARNER Patient   HIGHEST LEVEL OF EDUCATION - PRIMARY LEARNER  GRADUATED HIGH SCHOOL OR GED   BARRIERS PRIMARY LEARNER NONE   CO-LEARNER CAREGIVER No   PRIMARY LANGUAGE ENGLISH    NEED No   LEARNER PREFERENCE PRIMARY DEMONSTRATION     LISTENING     READING     PICTURES   ANSWERED BY Patient   RELATIONSHIP SELF       Depression Screening:  :     3 most recent PHQ Screens 8/10/2022   Little interest or pleasure in doing things Not at all   Feeling down, depressed, irritable, or hopeless Not at all   Total Score PHQ 2 0       Fall Risk Assessment:  :     No flowsheet data found. Abuse Screening:  :     Abuse Screening Questionnaire 8/10/2022 2022 2022   Do you ever feel afraid of your partner? N N N   Are you in a relationship with someone who physically or mentally threatens you? N N N   Is it safe for you to go home?  Y Y Y       Coordination of Care Questionnaire:  :     1) Have you been to an emergency room, urgent care clinic since your last visit? no   Hospitalized since your last visit? no             2) Have you seen or consulted any other health care providers outside of [address] [place] [address] since your last visit? no  (Include any pap smears or colon screenings in this section.)    3) Do you have an Advance Directive on file? no  Are you interested in receiving information about Advance Directives? no    Patient is accompanied by N/A I have received verbal consent from Serjio De La Rosa to discuss any/all medical information while they are present in the room. 4.  For patients aged 39-70: Has the patient had a colonoscopy / FIT/ Cologuard? No      If the patient is female:    5. For patients aged 41-77: Has the patient had a mammogram within the past 2 years? Yes - no Care Gap present      6. For patients aged 21-65: Has the patient had a pap smear?  NA - based on age or sex

## 2022-08-18 ENCOUNTER — TELEPHONE (OUTPATIENT)
Dept: FAMILY MEDICINE CLINIC | Age: 57
End: 2022-08-18

## 2022-08-18 NOTE — TELEPHONE ENCOUNTER
Patient called in and stated she needed her pain management referral renewed and sent for Dr. Aminah Paniagua. She has her next apt 9/15/22 with him. Advised Dr. Mariano Sandoval is on vacation this week but would send a message back for her and her nurse for next week.

## 2022-08-18 NOTE — TELEPHONE ENCOUNTER
Patient called and stated she was taking keflex and has two more days and infection has not gotten any worse but is not getting any better either. She wanted to know if bactrim double strength as she had that in March and it cleared the infection up all the way the last time. Jose Moss

## 2022-08-19 ENCOUNTER — TELEPHONE (OUTPATIENT)
Dept: FAMILY MEDICINE CLINIC | Age: 57
End: 2022-08-19

## 2022-08-19 NOTE — TELEPHONE ENCOUNTER
Pt is still wanting the bactrim as she has stated she has taken before and has helped and not had any issues with it and the keflex is not working    Call pt at [phone]

## 2022-08-19 NOTE — TELEPHONE ENCOUNTER
Pt upset and thinks it is ridiculous that she was not asked about the antibiotic before hand and does not feel she should be told to come back into the office for another appt just to get a different antibiotic called in for the same issue she was seen for    Pt refused to make appt as nothing available until next Thursday and won't be in town

## 2022-08-23 ENCOUNTER — HOSPITAL ENCOUNTER (OUTPATIENT)
Dept: GENERAL RADIOLOGY | Age: 57
Discharge: HOME | End: 2022-08-23
Payer: OTHER GOVERNMENT

## 2022-08-23 ENCOUNTER — OFFICE VISIT (OUTPATIENT)
Dept: FAMILY MEDICINE CLINIC | Age: 57
End: 2022-08-23
Payer: OTHER GOVERNMENT

## 2022-08-23 VITALS
HEIGHT: 63 IN | HEART RATE: 80 BPM | TEMPERATURE: 98.2 F | SYSTOLIC BLOOD PRESSURE: 140 MMHG | DIASTOLIC BLOOD PRESSURE: 80 MMHG | RESPIRATION RATE: 16 BRPM | BODY MASS INDEX: 25.52 KG/M2 | OXYGEN SATURATION: 98 % | WEIGHT: 144 LBS

## 2022-08-23 DIAGNOSIS — L97.519 DIABETIC ULCER OF RIGHT GREAT TOE (HCC): ICD-10-CM

## 2022-08-23 DIAGNOSIS — L97.519 DIABETIC ULCER OF RIGHT GREAT TOE (HCC): Primary | ICD-10-CM

## 2022-08-23 DIAGNOSIS — E11.621 DIABETIC ULCER OF RIGHT GREAT TOE (HCC): ICD-10-CM

## 2022-08-23 DIAGNOSIS — E11.621 DIABETIC ULCER OF RIGHT GREAT TOE (HCC): Primary | ICD-10-CM

## 2022-08-23 PROCEDURE — 3044F HG A1C LEVEL LT 7.0%: CPT | Performed by: FAMILY MEDICINE

## 2022-08-23 PROCEDURE — 99212 OFFICE O/P EST SF 10 MIN: CPT | Performed by: FAMILY MEDICINE

## 2022-08-23 PROCEDURE — 73630 X-RAY EXAM OF FOOT: CPT

## 2022-08-23 NOTE — PROGRESS NOTES
Identified pt with two pt identifiers(name and ). Chief Complaint   Patient presents with    Toe Swelling     Right big toe swollen         Health Maintenance Due   Topic    Pneumococcal 0-64 years (1 - PCV)    Eye Exam Retinal or Dilated     Colorectal Cancer Screening Combo     Shingrix Vaccine Age 50> (1 of 2)    Low dose CT lung screening     COVID-19 Vaccine (3 - Booster for Moderna series)       Wt Readings from Last 3 Encounters:   22 144 lb (65.3 kg)   08/10/22 178 lb (80.7 kg)   22 182 lb (82.6 kg)     Temp Readings from Last 3 Encounters:   22 98.2 °F (36.8 °C) (Temporal)   08/10/22 97.8 °F (36.6 °C) (Temporal)   22 98 °F (36.7 °C) (Temporal)     BP Readings from Last 3 Encounters:   22 (!) 140/80   08/10/22 136/70   22 128/74     Pulse Readings from Last 3 Encounters:   22 80   08/10/22 80   22 86         Learning Assessment:  :     Learning Assessment 2022   PRIMARY LEARNER Patient   HIGHEST LEVEL OF EDUCATION - PRIMARY LEARNER  GRADUATED HIGH SCHOOL OR GED   BARRIERS PRIMARY LEARNER NONE   CO-LEARNER CAREGIVER No   PRIMARY LANGUAGE ENGLISH    NEED No   LEARNER PREFERENCE PRIMARY DEMONSTRATION     LISTENING     READING     PICTURES   ANSWERED BY Patient   RELATIONSHIP SELF       Depression Screening:  :     3 most recent PHQ Screens 2022   Little interest or pleasure in doing things Not at all   Feeling down, depressed, irritable, or hopeless Not at all   Total Score PHQ 2 0       Fall Risk Assessment:  :     No flowsheet data found. Abuse Screening:  :     Abuse Screening Questionnaire 2022 8/10/2022 2022 2022   Do you ever feel afraid of your partner? N N N N   Are you in a relationship with someone who physically or mentally threatens you? N N N N   Is it safe for you to go home?  Pardeep Lopez       Coordination of Care Questionnaire:  :     1) Have you been to an emergency room, urgent care clinic since your last visit? no   Hospitalized since your last visit? no             2) Have you seen or consulted any other health care providers outside of [address] [place] [address] since your last visit? no  (Include any pap smears or colon screenings in this section.)    3) Do you have an Advance Directive on file? no  Are you interested in receiving information about Advance Directives? no    Patient is accompanied by N/A I have received verbal consent from Danyel Ferguson to discuss any/all medical information while they are present in the room. 4.  For patients aged 39-70: Has the patient had a colonoscopy / FIT/ Cologuard? No      If the patient is female:    5. For patients aged 41-77: Has the patient had a mammogram within the past 2 years? Yes - no Care Gap present      6. For patients aged 21-65: Has the patient had a pap smear?  No

## 2022-08-24 ENCOUNTER — HOSPITAL ENCOUNTER (EMERGENCY)
Age: 57
Discharge: HOME | End: 2022-08-24
Attending: EMERGENCY MEDICINE
Payer: OTHER GOVERNMENT

## 2022-08-24 VITALS
SYSTOLIC BLOOD PRESSURE: 104 MMHG | HEART RATE: 73 BPM | RESPIRATION RATE: 16 BRPM | DIASTOLIC BLOOD PRESSURE: 53 MMHG | HEIGHT: 66 IN | TEMPERATURE: 99 F | OXYGEN SATURATION: 95 % | BODY MASS INDEX: 28.73 KG/M2 | WEIGHT: 178.79 LBS

## 2022-08-24 DIAGNOSIS — G62.9 NEUROPATHY: ICD-10-CM

## 2022-08-24 DIAGNOSIS — M79.671 RIGHT FOOT PAIN: ICD-10-CM

## 2022-08-24 DIAGNOSIS — E11.621 DIABETIC ULCER OF RIGHT FOOT ASSOCIATED WITH TYPE 2 DIABETES MELLITUS, UNSPECIFIED PART OF FOOT, UNSPECIFIED ULCER STAGE (HCC): Primary | ICD-10-CM

## 2022-08-24 DIAGNOSIS — L97.519 DIABETIC ULCER OF RIGHT FOOT ASSOCIATED WITH TYPE 2 DIABETES MELLITUS, UNSPECIFIED PART OF FOOT, UNSPECIFIED ULCER STAGE (HCC): Primary | ICD-10-CM

## 2022-08-24 LAB
ANION GAP SERPL CALC-SCNC: 9 MMOL/L (ref 5–15)
BASOPHILS # BLD: 0 K/UL (ref 0–0.1)
BASOPHILS NFR BLD: 0 % (ref 0–1)
BUN SERPL-MCNC: 16 MG/DL (ref 6–20)
BUN/CREAT SERPL: 18 (ref 12–20)
CALCIUM SERPL-MCNC: 9.6 MG/DL (ref 8.5–10.1)
CHLORIDE SERPL-SCNC: 99 MMOL/L (ref 97–108)
CO2 SERPL-SCNC: 27 MMOL/L (ref 21–32)
CREAT SERPL-MCNC: 0.88 MG/DL (ref 0.55–1.02)
CRP SERPL-MCNC: <0.29 MG/DL (ref 0–0.6)
DIFFERENTIAL METHOD BLD: NORMAL
EOSINOPHIL # BLD: 0.1 K/UL (ref 0–0.4)
EOSINOPHIL NFR BLD: 1 % (ref 0–7)
ERYTHROCYTE [DISTWIDTH] IN BLOOD BY AUTOMATED COUNT: 11.8 % (ref 11.5–14.5)
ERYTHROCYTE [SEDIMENTATION RATE] IN BLOOD: 13 MM/HR (ref 0–30)
GLUCOSE SERPL-MCNC: 209 MG/DL (ref 65–100)
HCT VFR BLD AUTO: 42.8 % (ref 35–47)
HGB BLD-MCNC: 14.5 G/DL (ref 11.5–16)
IMM GRANULOCYTES # BLD AUTO: 0 K/UL (ref 0–0.04)
IMM GRANULOCYTES NFR BLD AUTO: 0 % (ref 0–0.5)
LYMPHOCYTES # BLD: 2.3 K/UL (ref 0.8–3.5)
LYMPHOCYTES NFR BLD: 30 % (ref 12–49)
MCH RBC QN AUTO: 31.7 PG (ref 26–34)
MCHC RBC AUTO-ENTMCNC: 33.9 G/DL (ref 30–36.5)
MCV RBC AUTO: 93.7 FL (ref 80–99)
MONOCYTES # BLD: 0.5 K/UL (ref 0–1)
MONOCYTES NFR BLD: 6 % (ref 5–13)
NEUTS SEG # BLD: 4.7 K/UL (ref 1.8–8)
NEUTS SEG NFR BLD: 62 % (ref 32–75)
NRBC # BLD: 0 K/UL (ref 0–0.01)
NRBC BLD-RTO: 0 PER 100 WBC
PLATELET # BLD AUTO: 274 K/UL (ref 150–400)
PMV BLD AUTO: 9.6 FL (ref 8.9–12.9)
POTASSIUM SERPL-SCNC: 4.1 MMOL/L (ref 3.5–5.1)
RBC # BLD AUTO: 4.57 M/UL (ref 3.8–5.2)
SODIUM SERPL-SCNC: 135 MMOL/L (ref 136–145)
WBC # BLD AUTO: 7.6 K/UL (ref 3.6–11)

## 2022-08-24 PROCEDURE — 36415 COLL VENOUS BLD VENIPUNCTURE: CPT

## 2022-08-24 PROCEDURE — 74011250636 HC RX REV CODE- 250/636: Performed by: EMERGENCY MEDICINE

## 2022-08-24 PROCEDURE — 99284 EMERGENCY DEPT VISIT MOD MDM: CPT

## 2022-08-24 PROCEDURE — 86140 C-REACTIVE PROTEIN: CPT

## 2022-08-24 PROCEDURE — 85652 RBC SED RATE AUTOMATED: CPT

## 2022-08-24 PROCEDURE — 74011000258 HC RX REV CODE- 258: Performed by: EMERGENCY MEDICINE

## 2022-08-24 PROCEDURE — 85025 COMPLETE CBC W/AUTO DIFF WBC: CPT

## 2022-08-24 PROCEDURE — 96365 THER/PROPH/DIAG IV INF INIT: CPT

## 2022-08-24 PROCEDURE — 80048 BASIC METABOLIC PNL TOTAL CA: CPT

## 2022-08-24 RX ORDER — DOXYCYCLINE 100 MG/1
100 TABLET ORAL 2 TIMES DAILY
Qty: 20 TABLET | Refills: 0 | Status: SHIPPED | OUTPATIENT
Start: 2022-08-24 | End: 2022-09-03

## 2022-08-24 RX ORDER — GABAPENTIN 100 MG/1
300 CAPSULE ORAL 3 TIMES DAILY
Qty: 29 CAPSULE | Refills: 0 | Status: SHIPPED | OUTPATIENT
Start: 2022-08-24 | End: 2022-09-06

## 2022-08-24 RX ORDER — OXYCODONE HCL 40 MG/1
40 TABLET, FILM COATED, EXTENDED RELEASE ORAL EVERY 12 HOURS
COMMUNITY

## 2022-08-24 RX ADMIN — DOXYCYCLINE 100 MG: 100 INJECTION, POWDER, LYOPHILIZED, FOR SOLUTION INTRAVENOUS at 18:52

## 2022-08-24 NOTE — DISCHARGE INSTRUCTIONS
Please follow-up with wound care as discussed. Thank you for allowing us to provide you with medical care today. We realize that you have many choices for your emergency care needs. We thank you for choosing [facility]. Please choose us in the future for any continued health care needs. We hope we addressed all of your medical concerns. We strive to provide excellent quality care in the Emergency Department. Anything less than excellent does not meet our expectations. The exam and treatment you received in the Emergency Department were for an emergent problem and are not intended as complete care. It is important that you follow up with a doctor, nurse practitioner, or physician's assistant for ongoing care. If your symptoms worsen or you do not improve as expected and you are unable to reach your usual health care provider, you should return to the Emergency Department. We are available 24 hours a day. Take this sheet with you when you go to your follow-up visit. If you have any problem arranging the follow-up visit, contact the Emergency Department immediately. Make an appointment your family doctor for follow up of this visit. Return to the ER if you are unable to be seen in a timely manner.

## 2022-08-24 NOTE — ED PROVIDER NOTES
59-year-old female with history of chronic back pain, diabetes, high cholesterol, hypertension presents to the emergency department concerned that she is got an infection in her right great toe. She apparently had an injury several weeks ago and was placed on Keflex by her primary care for concern for developing diabetic foot wound. No other antibiotics. Despite taking Keflex she has not had improvement. She completed a course. X-ray done yesterday to evaluate for potential osteomyelitis. Patient complains of nonhealing wound and odor. The history is provided by the patient and medical records. Wound Check   This is a new problem. The current episode started more than 1 week ago. The problem occurs constantly. The problem has been gradually worsening. The pain is mild. She has tried nothing for the symptoms.       Past Medical History:   Diagnosis Date    Chronic pain     back    Diabetes ([address]     Hypercholesterolemia     Hypertension     Thyroid disease        Past Surgical History:   Procedure Laterality Date    HX APPENDECTOMY      HX CARPAL TUNNEL RELEASE Bilateral     HX HAMMER TOE REPAIR  11/2021    HX HYSTERECTOMY  1992    HX ORTHOPAEDIC      back    IR CHOLECYSTOSTOMY PERCUTANEOUS           Family History:   Problem Relation Age of Onset    Diabetes Mother     Thyroid Disease Mother     Liver Disease Mother     Heart Attack Father     No Known Problems Sister     No Known Problems Brother     No Known Problems Maternal Grandmother     Cancer Maternal Grandfather         lung    No Known Problems Daughter     No Known Problems Son        Social History     Socioeconomic History    Marital status:      Spouse name: Not on file    Number of children: Not on file    Years of education: Not on file    Highest education level: Not on file   Occupational History    Not on file   Tobacco Use    Smoking status: Former     Packs/day: 1.00     Years: 20.00     Pack years: 20.00     Types: Cigarettes Quit date:      Years since quittin.6    Smokeless tobacco: Never   Vaping Use    Vaping Use: Never used   Substance and Sexual Activity    Alcohol use: Not Currently    Drug use: Not Currently    Sexual activity: Yes     Partners: Male     Birth control/protection: None   Other Topics Concern    Not on file   Social History Narrative    Not on file     Social Determinants of Health     Financial Resource Strain: Low Risk     Difficulty of Paying Living Expenses: Not hard at all   Food Insecurity: No Food Insecurity    Worried About Running Out of Food in the Last Year: Never true    Ran Out of Food in the Last Year: Never true   Transportation Needs: Not on file   Physical Activity: Not on file   Stress: Not on file   Social Connections: Not on file   Intimate Partner Violence: Not on file   Housing Stability: Not on file         ALLERGIES: Codeine, Tramadol, and Phenazopyridine    Review of Systems   Constitutional:  Negative for fatigue and fever. HENT:  Negative for sneezing and sore throat. Respiratory:  Negative for cough and shortness of breath. Cardiovascular:  Negative for chest pain and leg swelling. Gastrointestinal:  Negative for abdominal pain, diarrhea, nausea and vomiting. Genitourinary:  Negative for difficulty urinating and dysuria. Musculoskeletal:  Negative for arthralgias and myalgias. Skin:  Positive for wound. Negative for color change and rash. Neurological:  Negative for weakness and headaches. Psychiatric/Behavioral:  Negative for agitation and behavioral problems. Vitals:    22 1702 22 1715   BP: (!) 152/79 138/86   Pulse: 85    Resp: 16    Temp: 99 °F (37.2 °C)    SpO2: 96% 95%   Weight: 81.1 kg (178 lb 12.7 oz)    Height: 5' 6\" (1.676 m)             Physical Exam  Vitals and nursing note reviewed. Constitutional:       General: She is not in acute distress. Appearance: Normal appearance. She is well-developed.  She is not ill-appearing, toxic-appearing or diaphoretic. HENT:      Head: Normocephalic and atraumatic. Nose: Nose normal.      Mouth/Throat:      Mouth: Mucous membranes are moist.      Pharynx: Oropharynx is clear. Eyes:      Extraocular Movements: Extraocular movements intact. Conjunctiva/sclera: Conjunctivae normal.      Pupils: Pupils are equal, round, and reactive to light. Cardiovascular:      Rate and Rhythm: Normal rate and regular rhythm. Pulses: Normal pulses. Heart sounds: Normal heart sounds. Pulmonary:      Effort: Pulmonary effort is normal. No respiratory distress. Breath sounds: Normal breath sounds. No wheezing. Chest:      Chest wall: No tenderness. Abdominal:      General: Abdomen is flat. There is no distension. Palpations: Abdomen is soft. Tenderness: There is no abdominal tenderness. There is no guarding or rebound. Musculoskeletal:         General: Signs of injury (There is a small wound on the plantar aspect of the right MTP, there is some erythema developing on the dorsal side of the foot opposite the wound) present. No swelling, tenderness or deformity. Normal range of motion. Cervical back: Normal range of motion and neck supple. No rigidity. No muscular tenderness. Right lower leg: No edema. Left lower leg: No edema. Skin:     General: Skin is warm and dry. Capillary Refill: Capillary refill takes less than 2 seconds. Neurological:      General: No focal deficit present. Mental Status: She is alert and oriented to person, place, and time. Psychiatric:         Mood and Affect: Mood normal.         Behavior: Behavior normal.        MDM  Number of Diagnoses or Management Options  Diabetic ulcer of right foot associated with type 2 diabetes mellitus, unspecified part of foot, unspecified ulcer stage ([facility] [address]  Diagnosis management comments: 58-year-old female presents as above with diabetic foot wound.   There is mild local inflammation but no systemic symptoms or laboratory changes. X-ray from yesterday reviewed without evidence of osteomyelitis. Given mild diabetic foot infection will treat with doxycycline, give first dose in the ER today. She has shooting pains in her foot which sound neuropathic. We will give low-dose gabapentin pending follow-up primary care.        Amount and/or Complexity of Data Reviewed  Clinical lab tests: reviewed           Procedures

## 2022-08-24 NOTE — ED NOTES
Bedside shift change report given to [facility] (oncoming nurse) by Koki Mendieta. Teddy Stewart RN (offgoing nurse). Report included the following information SBAR.

## 2022-09-06 ENCOUNTER — HOSPITAL ENCOUNTER (OUTPATIENT)
Dept: WOUND CARE | Age: 57
Discharge: HOME | End: 2022-09-06
Payer: OTHER GOVERNMENT

## 2022-09-06 VITALS
RESPIRATION RATE: 18 BRPM | DIASTOLIC BLOOD PRESSURE: 73 MMHG | HEART RATE: 76 BPM | SYSTOLIC BLOOD PRESSURE: 123 MMHG | BODY MASS INDEX: 28.45 KG/M2 | TEMPERATURE: 97.2 F | WEIGHT: 177 LBS | HEIGHT: 66 IN

## 2022-09-06 PROCEDURE — 99213 OFFICE O/P EST LOW 20 MIN: CPT | Performed by: PODIATRIST

## 2022-09-06 PROCEDURE — 11042 DBRDMT SUBQ TIS 1ST 20SQCM/<: CPT | Performed by: PODIATRIST

## 2022-09-06 PROCEDURE — 99203 OFFICE O/P NEW LOW 30 MIN: CPT | Performed by: PODIATRIST

## 2022-09-06 PROCEDURE — 74011000250 HC RX REV CODE- 250: Performed by: PODIATRIST

## 2022-09-06 RX ADMIN — Medication: at 15:06

## 2022-09-06 NOTE — DISCHARGE INSTRUCTIONS
Discharge Instructions for  [facility]  [address] [place], [address] [facility]  Telephone: [phone]    NAME:  Higinio May  YOB: 1965  DATE:  @ED@      Wound Cleansing:   Do not scrub or use excessive force. Cleanse wound prior to applying a clean dressing with:    [] Normal Saline   [] Keep Wound Dry in Shower      [x] Wound Cleanser (baby shampoo )  [] May Shower at Discharge   [] cleanse with Sherol Sessions and Sherol Sessions baby shampoo lather leave 2-3 then rinse with water, pat dry and redress wound. Dressings:           Wound Location Right lateral plantar foot and Right great toe     Apply Primary Dressing:  Aquacel gauze tape    Change dressing:   [] Daily      [x] Every Other Day   [] Three times per week  [] Once a week   [] Do Not Change Dressing     [] Other:    Off-Loading:   [x] Off-loading when [x] walking  [x] Surgica shoe    Dietary:  [x] Diet as tolerated: [] Diabetic Diet:   [x] Increase Protein: examples ( Meat, cheese, eggs, greek yogurt, premier protein drink, fish, nuts )   [] Other:    Return Appointment:      [x] Return Appointment: With Dr. Khalif Xiong 2 week Electronically signed on 9/6/2022 at 12:24 PM     [address] [place] [address] Information: Should you experience any significant changes in your wound(s) or have questions about your wound care, please contact the [address] [place] [address] at [address] [place] [address] 8:00 am - 4:30. If you need help with your wound outside these hours and cannot wait until we are again available, contact your PCP or go to the hospital emergency room. PLEASE NOTE: IF YOU ARE UNABLE TO OBTAIN WOUND SUPPLIES, CONTINUE TO USE THE SUPPLIES YOU HAVE AVAILABLE UNTIL YOU ARE ABLE TO REACH US. IT IS MOST IMPORTANT TO KEEP THE WOUND COVERED AT ALL TIMES.      Physician Signature:_______________________  Dr. Khalif Xiong

## 2022-09-06 NOTE — WOUND CARE
09/06/22 1537   Wound Toe (Comment  which one) Right Great Toe, #1   Date First Assessed/Time First Assessed: 09/06/22 1457   Present on Hospital Admission: Yes  Location: Toe (Comment  which one)  Wound Location Orientation: Right  Wound Description: Great Toe, #1   Dressing/Treatment Alginate with Ag;Gauze dressing/dressing sponge;Tape/Soft cloth adhesive tape   Offloading for Diabetic Foot Ulcers Post-op shoe   Wound Foot Right;Plantar #2   Date First Assessed/Time First Assessed: 09/06/22 1457   Present on Hospital Admission: Yes  Location: Foot  Wound Location Orientation: Right;Plantar  Wound Description: #2   Dressing/Treatment Alginate with Ag;Gauze dressing/dressing sponge;Tape/Soft cloth adhesive tape   Offloading for Diabetic Foot Ulcers Post-op shoe   Discharge Condition: Stable     Pain: 5    Ambulatory Status: Walking    Discharge Destination: Home     Transportation: Car    Accompanied by: Self  and Family/Caregiver     Discharge instructions reviewed with Patient and Family/Caregiver  and copy or written instructions have been provided. All questions/concerns have been addressed at this time.

## 2022-09-06 NOTE — WOUND CARE
09/06/22 1458   Anesthetic   Anesthetic 4% Lidocaine Liquid Topical   Right Leg Edema Point of Measurement   Leg circumference 38.5 cm   Ankle circumference 22 cm   Left Leg Edema Point of Measurement   Leg circumference 39 cm   Ankle circumference 21.5 cm   LLE Peripheral Vascular    Capillary Refill Less than/equal to 3 seconds   Color Appropriate for race   Temperature Warm   Pedal Pulse Palpable   RLE Peripheral Vascular    Capillary Refill Less than/equal to 3 seconds   Color Appropriate for race   Temperature Warm   Pedal Pulse Palpable   Ankle-Brachial Index   Right Arm Systolic Pressure 204 mmHg   Left Arm Systolic Pressure 703   Right Ankle Systolic Pressure: Posterior Tibial (PT) 0 mmHg   Right Ankle Systolic Pressure: Dorsalis Pedis (DP) 106 mmHg   Left Ankle Systolic Pressure: Posterior Tibial (PT) 0 mmHg   Left Ankle Systolic Pressure: Dorsalis Pedis (DP) 120 mmHg   Right EMELIA 0.86 mmHg   Left EMELIA 0.98 mmHg   Overall EMELIA (Lower EMELIA) 0.86 mmHg   Wound Toe (Comment  which one) Right Great Toe, #1   Date First Assessed/Time First Assessed: 09/06/22 1457   Present on Hospital Admission: Yes  Location: Toe (Comment  which one)  Wound Location Orientation: Right  Wound Description: Great Toe, #1   Wound Image    Wound Length (cm) 0.6 cm   Wound Width (cm) 0.8 cm   Wound Depth (cm) 0.2 cm   Wound Surface Area (cm^2) 0.48 cm^2   Wound Volume (cm^3) 0.096 cm^3   Wound Assessment Pale granulation tissue   Drainage Amount Moderate   Drainage Description Serosanguinous   Wound Odor None   Zulma-Wound/Incision Assessment Hyperkeratosis (Callous); Intact   Edges Flat/open edges   Wound Foot Right;Plantar #2   Date First Assessed/Time First Assessed: 09/06/22 1457   Present on Hospital Admission: Yes  Location: Foot  Wound Location Orientation: Right;Plantar  Wound Description: #2   Wound Image    Wound Length (cm) 0.3 cm   Wound Width (cm) 0.2 cm   Wound Depth (cm) 0.2 cm   Wound Surface Area (cm^2) 0.06 cm^2   Wound Volume (cm^3) 0.012 cm^3   Wound Assessment Pink/red;Purple/maroon   Drainage Amount None   Wound Odor None   Zulma-Wound/Incision Assessment Intact   Edges Flat/open edges   Visit Vitals  /73 (BP 1 Location: Right upper arm, BP Patient Position: Sitting)   Pulse 76   Temp 97.2 °F (36.2 °C)   Resp 18   Ht 5' 6\" (1.676 m)   Wt 80.3 kg (177 lb)   BMI 28.57 kg/m²

## 2022-09-07 NOTE — WOUND CARE
[address]   Progress Note and Procedure Note     Riccardo Green  MEDICAL RECORD NUMBER:  [id number]  AGE: 61 y.o. RACE WHITE/NON-  GENDER: female  : 1965  EPISODE DATE:  2022    Subjective:     Chief Complaint   Patient presents with    Wound Check     New patient          HISTORY of PRESENT ILLNESS HPI    Riccardo Green is a 61 y.o. female who presents today for wound/ulcer evaluation. History of Wound Context: Ms. Conrado Kent is a 60yo female with a PMH of DM, hypercholesterolemia, HTN, thyroid ds who presents with a right great toe ulcer present since about 2022. Patient notes she had hammertoe surgery to her right 2nd toe in 2021. Since the procedure she has developed a callus in the area of her current wound. She has seen her primary care and has been to the ER because increase drainage and malodor. The ED started her on doxycyline, which she has completed.     Wound/Ulcer Pain Timing/Severity: intermittent  Quality of pain: aching  Severity:  5 / 10   Modifying Factors: Pain is relieved/improved with rest  Associated Signs/Symptoms: pain    Ulcer Identification:  Ulcer Type: diabetic    Contributing Factors: chronic pressure    Wound: right plantar great toe         PAST MEDICAL HISTORY    Past Medical History:   Diagnosis Date    Chronic pain     back    Diabetes ([address]     Hypercholesterolemia     Hypertension     Thyroid disease         PAST SURGICAL HISTORY    Past Surgical History:   Procedure Laterality Date    HX APPENDECTOMY      HX CARPAL TUNNEL RELEASE Bilateral     HX HAMMER TOE REPAIR  2021    HX HYSTERECTOMY      HX ORTHOPAEDIC      back    IR CHOLECYSTOSTOMY PERCUTANEOUS         FAMILY HISTORY    Family History   Problem Relation Age of Onset    Diabetes Mother     Thyroid Disease Mother     Liver Disease Mother     Heart Attack Father     No Known Problems Sister     No Known Problems Brother     No Known Problems Maternal Grandmother Cancer Maternal Grandfather         lung    No Known Problems Daughter     No Known Problems Son        SOCIAL HISTORY    Social History     Tobacco Use    Smoking status: Former     Packs/day: 1.00     Years: 20.00     Pack years: 20.00     Types: Cigarettes     Quit date:      Years since quittin.6    Smokeless tobacco: Never   Vaping Use    Vaping Use: Never used   Substance Use Topics    Alcohol use: Not Currently    Drug use: Not Currently       ALLERGIES    Allergies   Allergen Reactions    Codeine Other (comments)     Tachycardia and passed out    Tramadol Itching    Phenazopyridine Unknown (comments)       MEDICATIONS    Current Outpatient Medications on File Prior to Encounter   Medication Sig Dispense Refill    oxyCODONE ER (OxyCONTIN) 40 mg ER tablet Take 40 mg by mouth every twelve (12) hours. empagliflozin (Jardiance) 10 mg tablet Take 1 Tablet by mouth daily. 90 Tablet 1    cyclobenzaprine (FLEXERIL) 10 mg tablet TAKE 1 TABLET BY MOUTH AT BEDTIME AS NEEDED FOR MUSCLE CRAMPS      estradioL (ESTRACE) 2 mg tablet 1 Tablet daily. gabapentin (NEURONTIN) 100 mg capsule Take 400 mg by mouth nightly. levothyroxine (SYNTHROID) 75 mcg tablet 1 Tablet daily. lidocaine (LIDODERM) 5 % 3 Patches by TransDERmal route daily. lisinopriL (PRINIVIL, ZESTRIL) 10 mg tablet 1 Tablet daily. metFORMIN (GLUCOPHAGE) 500 mg tablet Take 1,000 mg by mouth two (2) times a day. naproxen (NAPROSYN) 250 mg tablet Take 250 mg by mouth two (2) times daily as needed. ondansetron hcl (ZOFRAN) 4 mg tablet Take 4 mg by mouth three (3) times daily as needed. oxyCODONE IR (ROXICODONE) 5 mg immediate release tablet Take 10 mg by mouth four (4) times daily. polyethylene glycol (MIRALAX) 17 gram/dose powder Take 17 g by mouth nightly. vitamin C-multivitamin-mineral 500 mg chew Take  by mouth daily. No current facility-administered medications on file prior to encounter. REVIEW OF SYSTEMS    Consitutional: no weight loss, night sweats, fatigue / malaise / lethargy. Musculoskeletal: no joint / extremity pain, misalignment, stiffness, decreased ROM, crepitus. Integument: No pruritis, rashes, lesions, right great toe. Psychiatric: No depression, anxiety, paranoia    Objective:     Visit Vitals  /73 (BP 1 Location: Right upper arm, BP Patient Position: Sitting)   Pulse 76   Temp 97.2 °F (36.2 °C)   Resp 18   Ht 5' 6\" (1.676 m)   Wt 80.3 kg (177 lb)   BMI 28.57 kg/m²       Wt Readings from Last 3 Encounters:   09/06/22 80.3 kg (177 lb)   08/24/22 81.1 kg (178 lb 12.7 oz)   08/23/22 65.3 kg (144 lb)       PHYSICAL EXAM    Vascular:  B/L LE  DP 1/4; PT 1/4  capillary fill time brisk, skin temperature is warm, varicosities are present. Dermatological:     Wound: 1  Location: right great toe  Measurements: per RN note  Margins: hyperkeratotic   Drainage: serous  Odor: none  Wound base: granular   Lymphangitic streaking? No.  Undermining? No.  Sinus tracts? No.  Exposed bone? No.  Subcutaneous crepitation on palpation? No.     Nails WNL. Skin is dry and scaly. There is no maceration of the interspaces of the feet b/l. Neurological:  DTR are present, protective sensation per 5.07 [place] Avel monofilament is intact, patient is AAOx3, mood is normal. Epicritic sensation is intact. Orthopedic:  B/L LE are symmetric, ROM of ankle, STJ, 1st MTPJ is WNL, MMT 5 out of 5 for B/L LE. No pedal amputations. Constitutional: Pt is a well developed, middle aged female. Lymphatics: negative tenderness to palpation of neck/axillary/inguinal nodes.     Assessment:      Problem List Items Addressed This Visit    None      Wound Toe (Comment  which one) Right Great Toe, #1 (Active)   Wound Image   09/06/22 3012   Dressing/Treatment Alginate with Ag;Gauze dressing/dressing sponge;Tape/Soft cloth adhesive tape 09/06/22 4620   Offloading for Diabetic Foot Ulcers Post-op shoe 09/06/22 1537   Wound Length (cm) 0.6 cm 09/06/22 1458   Wound Width (cm) 0.8 cm 09/06/22 1458   Wound Depth (cm) 0.2 cm 09/06/22 1458   Wound Surface Area (cm^2) 0.48 cm^2 09/06/22 1458   Wound Volume (cm^3) 0.096 cm^3 09/06/22 1458   Post-Procedure Length (cm) 0.8 cm 09/06/22 1518   Post-Procedure Width (cm) 0.3 cm 09/06/22 1518   Post-Procedure Surface Area (cm^2) 0.24 cm^2 09/06/22 1518   Wound Assessment Pale granulation tissue 09/06/22 1458   Drainage Amount Moderate 09/06/22 1458   Drainage Description Serosanguinous 09/06/22 1458   Wound Odor None 09/06/22 1458   Zulma-Wound/Incision Assessment Hyperkeratosis (Callous); Intact 09/06/22 1458   Edges Flat/open edges 09/06/22 1458   Number of days: 0       Wound Foot Right;Plantar #2 (Active)   Wound Image   09/06/22 1458   Dressing/Treatment Alginate with Ag;Gauze dressing/dressing sponge;Tape/Soft cloth adhesive tape 09/06/22 1537   Offloading for Diabetic Foot Ulcers Post-op shoe 09/06/22 1537   Wound Length (cm) 0.3 cm 09/06/22 1458   Wound Width (cm) 0.2 cm 09/06/22 1458   Wound Depth (cm) 0.2 cm 09/06/22 1458   Wound Surface Area (cm^2) 0.06 cm^2 09/06/22 1458   Wound Volume (cm^3) 0.012 cm^3 09/06/22 1458   Post-Procedure Length (cm) 0.3 cm 09/06/22 1518   Post-Procedure Width (cm) 0.2 cm 09/06/22 1518   Post-Procedure Depth (cm) 0.3 cm 09/06/22 1518   Post-Procedure Surface Area (cm^2) 0.06 cm^2 09/06/22 1518   Post-Procedure Volume (cm^3) 0.018 cm^3 09/06/22 1518   Wound Assessment Pink/red;Purple/maroon 09/06/22 1458   Drainage Amount None 09/06/22 1458   Wound Odor None 09/06/22 1458   Zulma-Wound/Incision Assessment Intact 09/06/22 1458   Edges Flat/open edges 09/06/22 1458   Number of days: 0       Incision 08/24/22 Toe (Comment  which one) Plantar (Active)   Number of days: 13       Debridement Wound Care        Problem List Items Addressed This Visit    None      Procedure Note  Indications:  Based on my examination of this patient's wound(s)/ulcer(s) today, debridement is required to promote healing and evaluate the wound base. Performed by: Junita Mcburney, DPM    Consent obtained: Yes    Time out taken: Yes    Debridement: Excisional    Using curette the wound(s)/ulcer(s) was/were sharply debrided down through and including the removal of    subcutaneous tissue    Devitalized Tissue Debrided: slough and callus    Pre Debridement Measurements:  Are located in the Wound/Ulcer Documentation Flow Sheet    Diabetic ulcer, fat layer exposed    Wound/Ulcer #: 1    Post Debridement Measurements:  Wound/Ulcer Descriptions are Pre Debridement except measurements:    Wound Toe (Comment  which one) Right Great Toe, #1 (Active)   Wound Image   09/06/22 1458   Dressing/Treatment Alginate with Ag;Gauze dressing/dressing sponge;Tape/Soft cloth adhesive tape 09/06/22 1537   Offloading for Diabetic Foot Ulcers Post-op shoe 09/06/22 1537   Wound Length (cm) 0.6 cm 09/06/22 1458   Wound Width (cm) 0.8 cm 09/06/22 1458   Wound Depth (cm) 0.2 cm 09/06/22 1458   Wound Surface Area (cm^2) 0.48 cm^2 09/06/22 1458   Wound Volume (cm^3) 0.096 cm^3 09/06/22 1458   Post-Procedure Length (cm) 0.8 cm 09/06/22 1518   Post-Procedure Width (cm) 0.3 cm 09/06/22 1518   Post-Procedure Surface Area (cm^2) 0.24 cm^2 09/06/22 1518   Wound Assessment Pale granulation tissue 09/06/22 1458   Drainage Amount Moderate 09/06/22 1458   Drainage Description Serosanguinous 09/06/22 1458   Wound Odor None 09/06/22 1458   Zulma-Wound/Incision Assessment Hyperkeratosis (Callous); Intact 09/06/22 1458   Edges Flat/open edges 09/06/22 1458   Number of days: 0       Wound Foot Right;Plantar #2 (Active)   Wound Image   09/06/22 1458   Dressing/Treatment Alginate with Ag;Gauze dressing/dressing sponge;Tape/Soft cloth adhesive tape 09/06/22 1537   Offloading for Diabetic Foot Ulcers Post-op shoe 09/06/22 1537   Wound Length (cm) 0.3 cm 09/06/22 1458   Wound Width (cm) 0.2 cm 09/06/22 1458   Wound Depth (cm) 0.2 cm 09/06/22 1458   Wound Surface Area (cm^2) 0.06 cm^2 09/06/22 1458   Wound Volume (cm^3) 0.012 cm^3 09/06/22 1458   Post-Procedure Length (cm) 0.3 cm 09/06/22 1518   Post-Procedure Width (cm) 0.2 cm 09/06/22 1518   Post-Procedure Depth (cm) 0.3 cm 09/06/22 1518   Post-Procedure Surface Area (cm^2) 0.06 cm^2 09/06/22 1518   Post-Procedure Volume (cm^3) 0.018 cm^3 09/06/22 1518   Wound Assessment Pink/red;Purple/maroon 09/06/22 1458   Drainage Amount None 09/06/22 1458   Wound Odor None 09/06/22 1458   Zulma-Wound/Incision Assessment Intact 09/06/22 1458   Edges Flat/open edges 09/06/22 1458   Number of days: 0       Incision 08/24/22 Toe (Comment  which one) Plantar (Active)   Number of days: 13        Total Surface Area Debrided:  <20 sq cm     Estimated Blood Loss:  Minimal     Hemostasis Achieved: Pressure    Procedural Pain: 1 / 10     Post Procedural Pain: 5 / 10     Response to treatment: Well tolerated by patient     Plan:     Right foot ulcer to fat (L97.512)    Diabetes with foot ulcer (E11.621)    - Pt seen and evaluated  - Pt presents with a right foot ulcer  - Wound debrided per procedure note above   - Wound care with Aquacel   - Offloading in surgical. 1/4 in felt offloading padding added. - Pt has an upcoming appointment with ortho. Discussed that non-surgically she could try custom orthotics to off-load pressure. - Pt to follow up in 2 weeks. Treatment Note please see attached Discharge Instructions    Written patient dismissal instructions given to patient or POA.          Electronically signed by Brian Gates DPM on 9/6/2022 at 9:05 PM

## 2022-09-20 ENCOUNTER — HOSPITAL ENCOUNTER (OUTPATIENT)
Dept: WOUND CARE | Age: 57
Discharge: HOME | End: 2022-09-20
Payer: OTHER GOVERNMENT

## 2022-09-20 VITALS
TEMPERATURE: 97.9 F | RESPIRATION RATE: 18 BRPM | SYSTOLIC BLOOD PRESSURE: 115 MMHG | DIASTOLIC BLOOD PRESSURE: 59 MMHG | HEART RATE: 75 BPM

## 2022-09-20 PROCEDURE — 11056 PARNG/CUTG B9 HYPRKR LES 2-4: CPT

## 2022-09-20 PROCEDURE — 74011000250 HC RX REV CODE- 250: Performed by: PODIATRIST

## 2022-09-20 RX ADMIN — Medication: at 13:11

## 2022-09-20 NOTE — WOUND CARE
09/20/22 1306   Anesthetic   Anesthetic 4% Lidocaine Liquid Topical   Right Leg Edema Point of Measurement   Leg circumference 38 cm   Ankle circumference 21.7 cm   RLE Peripheral Vascular    Capillary Refill Less than/equal to 3 seconds   Color Appropriate for race   Temperature Warm   Pedal Pulse Palpable   Wound Foot Right;Plantar #2   Date First Assessed/Time First Assessed: 09/06/22 1457   Present on Hospital Admission: Yes  Location: Foot  Wound Location Orientation: Right;Plantar  Wound Description: #2   Wound Image    Wound Length (cm) 0 cm   Wound Width (cm) 0 cm   Wound Depth (cm) 0 cm   Wound Surface Area (cm^2) 0 cm^2   Change in Wound Size % 100   Wound Volume (cm^3) 0 cm^3   Wound Healing % 100   Drainage Amount None   Wound Odor None   Zulma-Wound/Incision Assessment Intact   Edges Attached edges   Wound Toe (Comment  which one) Right Great Toe, #1   Date First Assessed/Time First Assessed: 09/06/22 1457   Present on Hospital Admission: Yes  Location: Toe (Comment  which one)  Wound Location Orientation: Right  Wound Description: Great Toe, #1   Wound Image    Wound Length (cm) 0.1 cm   Wound Width (cm) 0.1 cm   Wound Depth (cm) 0 cm   Wound Surface Area (cm^2) 0.01 cm^2   Change in Wound Size % 97.92   Wound Volume (cm^3) 0 cm^3   Wound Healing % 100   Drainage Amount None   Wound Odor None   Zulma-Wound/Incision Assessment Hyperkeratosis (Callous); Dry/flaky   Edges Attached edges   Pain 1   Pain Scale 1 Numeric (0 - 10)   Pain Intensity 1 0   Visit Vitals  BP (!) 115/59 (BP 1 Location: Right upper arm, BP Patient Position: Sitting)   Pulse 75   Temp 97.9 °F (36.6 °C)   Resp 18

## 2022-09-20 NOTE — DISCHARGE INSTRUCTIONS
Discharge Instructions for  [facility]  [address], [address] [facility]  Telephone: [phone]    NAME:  Sera Guadarrama  YOB: 1965  DATE:  9/20/2022    PCP and/or Endocrinologist needs to write you a note to state that you have diabetes for insurance to cover diabetic inserts and/or shoes    Follow up with Dr. Deshaun Linda in her podiatry office in about a month to watch callous and ensure inserts are working! Electronically signed on 9/20/2022 at 12:24 PM   [address] treatment has been completed at [facility] outpatient wound clinic on 9/20/2022, per Dr. Robyn Lozano. We know patients have several options when choosing a health care provider. We would like to express our sincere appreciation for having had the chance to be yours. More importantly, we enjoy having you as part of our family. We also hope your experience with us has surpassed your expectations and that you have been pleased with our service. We appreciate the confidence you've shown in selecting us as your health care provider and we will continue or commitment to provide the highest quality of service. Again, thank you for choosing us for your health care needs. If you have any further questions or concerns, please call [phone]. It has been our pleasure serving you and we hope to continue our relationship in the future, if the need occurs. Sincerely,  [address] Information: Should you experience any significant changes in your wound(s) or have questions about your wound care, please contact the [facility] at [address] [place] [address] 8:00 am - 4:30. If you need help with your wound outside these hours and cannot wait until we are again available, contact your PCP or go to the hospital emergency room.       Physician Signature:_______________________  Dr. Robyn Lozano

## 2022-09-20 NOTE — WOUND CARE
[address]   Progress Note and Procedure Note   NO Procedure      [address] [facility] RECORD NUMBER:  [id number]  AGE: 61 y.o. RACE WHITE/NON-  GENDER: female  : 1965  EPISODE DATE:  2022    Subjective:     Chief Complaint   Patient presents with    Wound Check     Right foot wounds         HISTORY of PRESENT ILLNESS HPI    Christina Ortega is a 61 y.o. female who presents today for wound/ulcer evaluation. History of Wound Context: Ms. Alec Dozier is a 60yo female with a PMH of DM, hypercholesterolemia, HTN, thyroid ds who presents with a right great toe ulcer present since about 2022. Patient notes she had hammertoe surgery to her right 2nd toe in 2021. Since the procedure she has developed a callus in the area of her current wound. She has seen her primary care and has been to the ER because increase drainage and malodor. The ED started her on doxycyline, which she has completed.     Wound/Ulcer Pain Timing/Severity: none  Quality of pain: n/a  Severity:  0 / 10   Modifying Factors: None  Associated Signs/Symptoms: none    Ulcer Identification:  Ulcer Type: diabetic    Contributing Factors: chronic pressure    Wound: right foot          PAST MEDICAL HISTORY    Past Medical History:   Diagnosis Date    Chronic pain     back    Diabetes ([address]     Hypercholesterolemia     Hypertension     Thyroid disease         PAST SURGICAL HISTORY    Past Surgical History:   Procedure Laterality Date    HX APPENDECTOMY      HX CARPAL TUNNEL RELEASE Bilateral     HX HAMMER TOE REPAIR  2021    HX HYSTERECTOMY      HX ORTHOPAEDIC      back    IR CHOLECYSTOSTOMY PERCUTANEOUS         FAMILY HISTORY    Family History   Problem Relation Age of Onset    Diabetes Mother     Thyroid Disease Mother     Liver Disease Mother     Heart Attack Father     No Known Problems Sister     No Known Problems Brother     No Known Problems Maternal Grandmother     Cancer Maternal Grandfather lung    No Known Problems Daughter     No Known Problems Son        SOCIAL HISTORY    Social History     Tobacco Use    Smoking status: Former     Packs/day: 1.00     Years: 20.00     Pack years: 20.00     Types: Cigarettes     Quit date:      Years since quittin.7    Smokeless tobacco: Never   Vaping Use    Vaping Use: Never used   Substance Use Topics    Alcohol use: Not Currently    Drug use: Not Currently       ALLERGIES    Allergies   Allergen Reactions    Codeine Other (comments)     Tachycardia and passed out    Tramadol Itching    Phenazopyridine Unknown (comments)       MEDICATIONS    Current Outpatient Medications on File Prior to Encounter   Medication Sig Dispense Refill    empagliflozin (Jardiance) 10 mg tablet Take 1 Tablet by mouth daily. 90 Tablet 1    cyclobenzaprine (FLEXERIL) 10 mg tablet TAKE 1 TABLET BY MOUTH AT BEDTIME AS NEEDED FOR MUSCLE CRAMPS      estradioL (ESTRACE) 2 mg tablet 1 Tablet daily. gabapentin (NEURONTIN) 100 mg capsule Take 400 mg by mouth nightly. levothyroxine (SYNTHROID) 75 mcg tablet 1 Tablet daily. lisinopriL (PRINIVIL, ZESTRIL) 10 mg tablet 1 Tablet daily. metFORMIN (GLUCOPHAGE) 500 mg tablet Take 1,000 mg by mouth two (2) times a day. vitamin C-multivitamin-mineral 500 mg chew Take  by mouth daily. oxyCODONE ER (OxyCONTIN) 40 mg ER tablet Take 40 mg by mouth every twelve (12) hours. lidocaine (LIDODERM) 5 % 3 Patches by TransDERmal route daily. naproxen (NAPROSYN) 250 mg tablet Take 250 mg by mouth two (2) times daily as needed. ondansetron hcl (ZOFRAN) 4 mg tablet Take 4 mg by mouth three (3) times daily as needed. oxyCODONE IR (ROXICODONE) 5 mg immediate release tablet Take 10 mg by mouth four (4) times daily. polyethylene glycol (MIRALAX) 17 gram/dose powder Take 17 g by mouth nightly. No current facility-administered medications on file prior to encounter.        REVIEW OF SYSTEMS    Consitutional: no weight loss, night sweats, fatigue / malaise / lethargy. Musculoskeletal: no joint / extremity pain, misalignment, stiffness, decreased ROM, crepitus. Integument: No pruritis, rashes, lesions, right great toe. Psychiatric: No depression, anxiety, paranoia    Objective:     Visit Vitals  BP (!) 115/59 (BP 1 Location: Right upper arm, BP Patient Position: Sitting)   Pulse 75   Temp 97.9 °F (36.6 °C)   Resp 18       Wt Readings from Last 3 Encounters:   09/06/22 80.3 kg (177 lb)   08/24/22 81.1 kg (178 lb 12.7 oz)   08/23/22 65.3 kg (144 lb)       PHYSICAL EXAM    Vascular:  B/L LE  DP 1/4; PT 1/4  capillary fill time brisk, skin temperature is warm, varicosities are present. Dermatological:     Wound: 1  Location: right great toe  Healed     Nails WNL. Skin is dry and scaly. There is no maceration of the interspaces of the feet b/l. Neurological:  DTR are present, protective sensation per 5.07 [place] Avel monofilament is intact, patient is AAOx3, mood is normal. Epicritic sensation is intact. Orthopedic:  B/L LE are symmetric, ROM of ankle, STJ, 1st MTPJ is WNL, MMT 5 out of 5 for B/L LE. No pedal amputations. Constitutional: Pt is a well developed, middle aged female. Lymphatics: negative tenderness to palpation of neck/axillary/inguinal nodes. Assessment:      Problem List Items Addressed This Visit    None      Procedure Note  Indications:  Based on my examination of this patient's wound(s)/ulcer(s) today, debridement is not required to promote healing and evaluate the wound base. Incision 08/24/22 Toe (Comment  which one) Plantar (Active)   Number of days: 27        Plan:     Right foot ulcer to fat (Q63.613)     Diabetes with foot ulcer (E11.621)     - Pt seen and evaluated  - Pt presents with a right foot ulcer - healed  - Callus debrided right medial hallux and submet 5 without incident  - DM shoe packed dispensed.  Recommended otc inserts.  - Pt discussed surgical treatment with orthopedic doctor this morning, but she wants to exhaust conservative options first.  - Patient discharged from wound care. Follow up at [facility]. Treatment Note please see attached Discharge Instructions    Written patient dismissal instructions given to patient or POA.          Electronically signed by Jason Child DPM on 9/20/2022 at 1:44 PM

## 2022-09-30 ENCOUNTER — LAB ONLY (OUTPATIENT)
Dept: FAMILY MEDICINE CLINIC | Age: 57
End: 2022-09-30

## 2022-09-30 ENCOUNTER — HOSPITAL ENCOUNTER (OUTPATIENT)
Dept: ULTRASOUND IMAGING | Age: 57
Discharge: HOME | End: 2022-09-30
Attending: INTERNAL MEDICINE
Payer: OTHER GOVERNMENT

## 2022-09-30 ENCOUNTER — TRANSCRIBE ORDER (OUTPATIENT)
Dept: GENERAL RADIOLOGY | Age: 57
End: 2022-09-30

## 2022-09-30 ENCOUNTER — OFFICE VISIT (OUTPATIENT)
Dept: FAMILY MEDICINE CLINIC | Age: 57
End: 2022-09-30
Payer: OTHER GOVERNMENT

## 2022-09-30 ENCOUNTER — HOSPITAL ENCOUNTER (OUTPATIENT)
Dept: GENERAL RADIOLOGY | Age: 57
Discharge: HOME | End: 2022-09-30
Payer: OTHER GOVERNMENT

## 2022-09-30 VITALS
TEMPERATURE: 97.8 F | DIASTOLIC BLOOD PRESSURE: 84 MMHG | RESPIRATION RATE: 18 BRPM | HEART RATE: 72 BPM | SYSTOLIC BLOOD PRESSURE: 134 MMHG | WEIGHT: 181 LBS | HEIGHT: 66 IN | OXYGEN SATURATION: 98 % | BODY MASS INDEX: 29.09 KG/M2

## 2022-09-30 DIAGNOSIS — M25.561 CHRONIC PAIN OF RIGHT KNEE: ICD-10-CM

## 2022-09-30 DIAGNOSIS — M79.89 RIGHT LEG SWELLING: ICD-10-CM

## 2022-09-30 DIAGNOSIS — E11.65 TYPE 2 DIABETES MELLITUS WITH HYPERGLYCEMIA, WITHOUT LONG-TERM CURRENT USE OF INSULIN (HCC): ICD-10-CM

## 2022-09-30 DIAGNOSIS — M79.674 PAIN OF RIGHT GREAT TOE: ICD-10-CM

## 2022-09-30 DIAGNOSIS — M25.561 ACUTE PAIN OF RIGHT KNEE: ICD-10-CM

## 2022-09-30 DIAGNOSIS — L97.519 DIABETIC ULCER OF RIGHT GREAT TOE (HCC): Primary | ICD-10-CM

## 2022-09-30 DIAGNOSIS — G89.29 CHRONIC PAIN OF RIGHT KNEE: ICD-10-CM

## 2022-09-30 DIAGNOSIS — E11.621 DIABETIC ULCER OF RIGHT GREAT TOE (HCC): Primary | ICD-10-CM

## 2022-09-30 PROCEDURE — 93971 EXTREMITY STUDY: CPT

## 2022-09-30 PROCEDURE — 99213 OFFICE O/P EST LOW 20 MIN: CPT | Performed by: INTERNAL MEDICINE

## 2022-09-30 PROCEDURE — 73562 X-RAY EXAM OF KNEE 3: CPT

## 2022-09-30 PROCEDURE — 3044F HG A1C LEVEL LT 7.0%: CPT | Performed by: INTERNAL MEDICINE

## 2022-09-30 RX ORDER — TIZANIDINE 4 MG/1
4 TABLET ORAL
COMMUNITY
Start: 2022-07-31

## 2022-09-30 NOTE — PROGRESS NOTES
Identified pt with two pt identifiers(name and ). Chief Complaint   Patient presents with    Diabetes     Shoes and inserts     Knee Pain     RT x a few months        Health Maintenance Due   Topic    Pneumococcal 0-64 years (1 - PCV)    Eye Exam Retinal or Dilated     Colorectal Cancer Screening Combo     Shingrix Vaccine Age 50> (1 of 2)    Low dose CT lung screening     COVID-19 Vaccine (3 - Booster for Moderna series)    Flu Vaccine (1)       Wt Readings from Last 3 Encounters:   22 181 lb (82.1 kg)   22 177 lb (80.3 kg)   22 178 lb 12.7 oz (81.1 kg)     Temp Readings from Last 3 Encounters:   22 97.8 °F (36.6 °C) (Temporal)   22 97.9 °F (36.6 °C)   22 97.2 °F (36.2 °C)     BP Readings from Last 3 Encounters:   22 134/84   22 (!) 115/59   22 123/73     Pulse Readings from Last 3 Encounters:   22 72   22 75   22 76         Learning Assessment:  :     Learning Assessment 2022   PRIMARY LEARNER Patient   HIGHEST LEVEL OF EDUCATION - PRIMARY LEARNER  GRADUATED HIGH SCHOOL OR GED   BARRIERS PRIMARY LEARNER NONE   CO-LEARNER CAREGIVER No   PRIMARY LANGUAGE ENGLISH    NEED No   LEARNER PREFERENCE PRIMARY DEMONSTRATION     LISTENING     READING     PICTURES   ANSWERED BY Patient   RELATIONSHIP SELF       Depression Screening:  :     3 most recent PHQ Screens 2022   Little interest or pleasure in doing things Not at all   Feeling down, depressed, irritable, or hopeless Not at all   Total Score PHQ 2 0       Fall Risk Assessment:  :     No flowsheet data found. Abuse Screening:  :     Abuse Screening Questionnaire 2022 8/10/2022 2022 2022   Do you ever feel afraid of your partner? N N N N   Are you in a relationship with someone who physically or mentally threatens you? N N N N   Is it safe for you to go home?  Shawna Arango       Coordination of Care Questionnaire:  :     1) Have you been to an emergency room, urgent care clinic since your last visit? no   Hospitalized since your last visit? no             2) Have you seen or consulted any other health care providers outside of [address] [place] [address] since your last visit? no  (Include any pap smears or colon screenings in this section.)    3) Do you have an Advance Directive on file? no  Are you interested in receiving information about Advance Directives? no    4. For patients aged 39-70: Has the patient had a colonoscopy / FIT/ Cologuard? No      If the patient is female:    5. For patients aged 41-77: Has the patient had a mammogram within the past 2 years? Yes - no Care Gap present      6. For patients aged 21-65: Has the patient had a pap smear?  Yes

## 2022-09-30 NOTE — PROGRESS NOTES
Chief Complaint   Patient presents with    Diabetes     Shoes and inserts     Knee Pain     RT x a few months       Assessment/ Plan:   Diagnoses and all orders for this visit:    1. Diabetic ulcer of right great toe [facility])   Patient also has pre-diabetic ulcers as outlined in the anatomy below. She has significant bunions on both feet. She has abnormal sensation and monofilament testing. She has poor proprioception. The patient would benefit from diabetic shoes as outlined. 2. Chronic pain of right knee  -     DUPLEX LOWER EXT VENOUS RIGHT; Future    3. Type 2 diabetes mellitus with hyperglycemia, without long-term current use of insulin ([facility])  Lab Results   Component Value Date/Time    Hemoglobin A1c 6.6 (H) 04/18/2022 08:39 AM      Diabetic Goals:  HgA1C <7,  LDL cholesterol <100, Blood pressure <140/80. Please continue with your medications as ordered  I also recommend yearly eye exams and daily foot care. 4. Acute pain of right knee   Will send for xray of the right knee. 5. Pain of right great toe  -     URIC ACID; Future  -     METABOLIC PANEL, BASIC; Future    6. Right leg swelling  -     DUPLEX LOWER EXT VENOUS RIGHT; Future  -     CBC WITH AUTOMATED DIFF; Future    I have discussed the diagnosis with the patient and the intended treatment plan as seen in the above orders. The patient has received an after-visit summary and questions were answered concerning future plans. Asked to return should symptoms worsen or not improve with treatment. Any pending labs and studies will be relayed to patient when they become available. Pt verbalizes understanding of plan of care and denies further questions or concerns at this time. Subjective:   David Yen is a 61 y.o. female with a h/o T2DM she also has a diabetic ulcer of the right great toe. She was recently evaluate by Dr. Jackie Aguilar (podiatrist) and needs diabetic shoes.  She will require a foot exam in the office today, letter of necessity and also this progress note. She reports that she had surgery last November 2021 for bunionectomy. She was lost to follow up due to COVID-19. The toe got infected and it never healed. She was seen by Np and was given oral antibiotics and it did not heal. She was then sent to the ER and had in hospital evaluation and IV antibiotics. She was sent to the wound care clinic and met Dr. Torres An there who is also a podiatrist.     XR Results (most recent):  Results from [place] encounter on 08/23/22    XR FOOT RT MIN 3 V    Narrative  EXAM: XR FOOT RT MIN 3 V    INDICATION: Right great toe diabetic ulcer evaluation. COMPARISON: None. FINDINGS: Three views of the right foot demonstrate no fracture. There is a  shallow soft tissue defect along the plantar aspect of the great toe distal  phalanx. There is no evidence for gas gangrene or osseous structures unchanged. Hardware is noted in the second ray. There is hyperdense soft tissue swelling as  well as corticated erosions associated with the first and fifth MTP joints. Incidental note is made of small Achilles and anterior spurs. Impression  Small ulcer without evidence for osteomyelitis or gas gangrene. Probable gout of the first and fifth MTP joints. Incidental plantar and Achilles spurs. HISTORICAL  PMH, PSH, FHX, SOCHX, ALLERGIES and MES were reviewed and updated today. Review of Systems  Review of Systems   Musculoskeletal:  Positive for joint pain (right great toe). All other systems reviewed and are negative. Objective:     Vitals:    09/30/22 1000   BP: 134/84   Pulse: 72   Resp: 18   Temp: 97.8 °F (36.6 °C)   TempSrc: Temporal   SpO2: 98%   Weight: 181 lb (82.1 kg)   Height: 5' 6\" (1.676 m)       Physical Exam  Constitutional:       Appearance: Normal appearance. Cardiovascular:      Pulses:           Dorsalis pedis pulses are 1+ on the right side and 1+ on the left side.         Posterior tibial pulses are 1+ on the right side and 1+ on the left side. Musculoskeletal:      Right foot: Deformity, bunion and prominent metatarsal heads present. No Charcot foot or foot drop. Left foot: Deformity, bunion and prominent metatarsal heads present. No Charcot foot or foot drop. Feet:    Feet:      Right foot:      Skin integrity: Ulcer (Right great toe) and callus present. Toenail Condition: Right toenails are normal.      Left foot:      Skin integrity: Callus present. Toenail Condition: Left toenails are normal.   Neurological:      Mental Status: She is alert.      Diabetic foot exam:      Left Foot:              Visual Exam: significant bunion on both feet              Pulse DP: 1+ (decreased)              Filament test: abnormal sensation               Vibratory sensation: Vibratory sensation: normal                       Right Foot:              Visual Exam: Bunion and healing skin wound over 1st metatarsal              Pulse DP: 1+ (normal)              Filament test: abnormal sensation               Vibratory sensation: Vibratory sensation: normal    Angus Watson MD  [facility]   09/30/22

## 2022-09-30 NOTE — LETTER
9/30/2022 12:09 PM    Ms. Atul Orr  [address] [place] [id number]    To whom it may concern:    Atul Orr is a patient at [facility]. This letter is to recommend that she be supported to obtain diabetic orthotic shoes. 1. Diabetic ulcer of right great toe [facility])   Patient also has pre-diabetic ulcers as outlined in the anatomy below. She has significant bunions on both feet. She has abnormal sensation and monofilament testing. She has poor proprioception. The patient would benefit from diabetic shoes as outlined. Physical Exam  Constitutional:       Appearance: Normal appearance. Cardiovascular:      Pulses:           Dorsalis pedis pulses are 1+ on the right side and 1+ on the left side. Posterior tibial pulses are 1+ on the right side and 1+ on the left side. Musculoskeletal:      Right foot: Deformity, bunion and prominent metatarsal heads present. No Charcot foot or foot drop. Left foot: Deformity, bunion and prominent metatarsal heads present. No Charcot foot or foot drop. Feet:    Feet:      Right foot:      Skin integrity: Ulcer (Right great toe) and callus present. Toenail Condition: Right toenails are normal.      Left foot:      Skin integrity: Callus present. Toenail Condition: Left toenails are normal.   Neurological:      Mental Status: She is alert.      Sincerely,       Mick Beach MD

## 2022-10-01 LAB
ANION GAP SERPL CALC-SCNC: 7 MMOL/L (ref 5–15)
BASOPHILS # BLD: 0 K/UL (ref 0–0.1)
BASOPHILS NFR BLD: 0 % (ref 0–1)
BUN SERPL-MCNC: 19 MG/DL (ref 6–20)
BUN/CREAT SERPL: 23 (ref 12–20)
CALCIUM SERPL-MCNC: 9.2 MG/DL (ref 8.5–10.1)
CHLORIDE SERPL-SCNC: 104 MMOL/L (ref 97–108)
CO2 SERPL-SCNC: 27 MMOL/L (ref 21–32)
CREAT SERPL-MCNC: 0.81 MG/DL (ref 0.55–1.02)
DIFFERENTIAL METHOD BLD: NORMAL
EOSINOPHIL # BLD: 0.1 K/UL (ref 0–0.4)
EOSINOPHIL NFR BLD: 1 % (ref 0–7)
ERYTHROCYTE [DISTWIDTH] IN BLOOD BY AUTOMATED COUNT: 12 % (ref 11.5–14.5)
GLUCOSE SERPL-MCNC: 143 MG/DL (ref 65–100)
HCT VFR BLD AUTO: 43.2 % (ref 35–47)
HGB BLD-MCNC: 14.2 G/DL (ref 11.5–16)
IMM GRANULOCYTES # BLD AUTO: 0 K/UL (ref 0–0.04)
IMM GRANULOCYTES NFR BLD AUTO: 0 % (ref 0–0.5)
LYMPHOCYTES # BLD: 1.7 K/UL (ref 0.8–3.5)
LYMPHOCYTES NFR BLD: 23 % (ref 12–49)
MCH RBC QN AUTO: 31.7 PG (ref 26–34)
MCHC RBC AUTO-ENTMCNC: 32.9 G/DL (ref 30–36.5)
MCV RBC AUTO: 96.4 FL (ref 80–99)
MONOCYTES # BLD: 0.5 K/UL (ref 0–1)
MONOCYTES NFR BLD: 6 % (ref 5–13)
NEUTS SEG # BLD: 5.1 K/UL (ref 1.8–8)
NEUTS SEG NFR BLD: 70 % (ref 32–75)
NRBC # BLD: 0 K/UL (ref 0–0.01)
NRBC BLD-RTO: 0 PER 100 WBC
PLATELET # BLD AUTO: 277 K/UL (ref 150–400)
PMV BLD AUTO: 10 FL (ref 8.9–12.9)
POTASSIUM SERPL-SCNC: 4.4 MMOL/L (ref 3.5–5.1)
RBC # BLD AUTO: 4.48 M/UL (ref 3.8–5.2)
SODIUM SERPL-SCNC: 138 MMOL/L (ref 136–145)
URATE SERPL-MCNC: 3.8 MG/DL (ref 2.6–6)
WBC # BLD AUTO: 7.3 K/UL (ref 3.6–11)

## 2022-10-20 NOTE — PROGRESS NOTES
Jesus Kuo (: 1965) is a 61 y.o. female, established patient, here for evaluation of the following chief complaint(s): Toe Swelling (Right big toe swollen )       ASSESSMENT/PLAN:  Below is the assessment and plan developed based on review of pertinent history, physical exam, labs, studies, and medications. 1. Diabetic ulcer of right great toe (HCC)  -     XR FOOT RT MIN 3 V; Future    Advised patient that she should get an xray, will see what that shows, may order a MRI to see the extent. I am concerned about osteomyelitis. Have advised that the patient may need IV antibiotics. Understands. No follow-ups on file. SUBJECTIVE/OBJECTIVE:  Patient with a non healing ulcer on the bottom of her right great toe, failed outpatient oral Abx, now presents for a follow up. Has a fu with a foot doctor in about 2 weeks. States that pain is getting worse, has completed her abx. Denies fever. She is wearing a very tight compression sock. Review of Systems   Constitutional:  Negative for chills, fatigue and fever. HENT: Negative. Eyes: Negative. Respiratory: Negative. Cardiovascular: Negative. Gastrointestinal: Negative. Endocrine: Negative. Genitourinary: Negative. Musculoskeletal: Negative. Skin: Negative. Allergic/Immunologic: Negative. Neurological: Negative. Hematological: Negative. Psychiatric/Behavioral: Negative. Physical Exam  Constitutional:       General: She is not in acute distress. Appearance: Normal appearance. She is normal weight. She is not ill-appearing or diaphoretic. HENT:      Head: Normocephalic and atraumatic. Right Ear: External ear normal.      Left Ear: External ear normal.   Cardiovascular:      Rate and Rhythm: Normal rate and regular rhythm. Musculoskeletal:      Cervical back: Normal range of motion and neck supple. Right foot: Tenderness, bony tenderness and crepitus present. Normal pulse.       Comments: Right great toe with tunneling of skin and possible osteomyelitis. Neurological:      Mental Status: She is alert. On this date 08/23/2022 I have spent 20 minutes reviewing previous notes, test results and face to face with the patient discussing the diagnosis and importance of compliance with the treatment plan as well as documenting on the day of the visit. An electronic signature was used to authenticate this note.   -- Haley Villegas MD Yes...

## 2022-11-02 ENCOUNTER — PATIENT MESSAGE (OUTPATIENT)
Dept: FAMILY MEDICINE CLINIC | Age: 57
End: 2022-11-02

## 2022-11-02 RX ORDER — EMPAGLIFLOZIN 10 MG/1
TABLET, FILM COATED ORAL
Qty: 90 TABLET | Refills: 3 | Status: SHIPPED | OUTPATIENT
Start: 2022-11-02

## 2022-11-09 ENCOUNTER — OFFICE VISIT (OUTPATIENT)
Dept: FAMILY MEDICINE CLINIC | Age: 57
End: 2022-11-09
Payer: OTHER GOVERNMENT

## 2022-11-09 ENCOUNTER — LAB ONLY (OUTPATIENT)
Dept: FAMILY MEDICINE CLINIC | Age: 57
End: 2022-11-09

## 2022-11-09 VITALS
HEART RATE: 84 BPM | DIASTOLIC BLOOD PRESSURE: 70 MMHG | SYSTOLIC BLOOD PRESSURE: 118 MMHG | WEIGHT: 178 LBS | BODY MASS INDEX: 28.61 KG/M2 | TEMPERATURE: 97.1 F | RESPIRATION RATE: 16 BRPM | OXYGEN SATURATION: 97 % | HEIGHT: 66 IN

## 2022-11-09 DIAGNOSIS — E11.9 ENCOUNTER FOR DIABETIC FOOT EXAM (HCC): ICD-10-CM

## 2022-11-09 DIAGNOSIS — Z12.11 SCREENING FOR COLON CANCER: ICD-10-CM

## 2022-11-09 DIAGNOSIS — M25.562 CHRONIC PAIN OF LEFT KNEE: ICD-10-CM

## 2022-11-09 DIAGNOSIS — Z12.31 ENCOUNTER FOR SCREENING MAMMOGRAM FOR MALIGNANT NEOPLASM OF BREAST: ICD-10-CM

## 2022-11-09 DIAGNOSIS — E55.9 VITAMIN D DEFICIENCY: ICD-10-CM

## 2022-11-09 DIAGNOSIS — M21.962 DEFORMITY OF BOTH FEET: ICD-10-CM

## 2022-11-09 DIAGNOSIS — E11.621 DIABETIC ULCER OF RIGHT GREAT TOE (HCC): ICD-10-CM

## 2022-11-09 DIAGNOSIS — E78.2 MIXED HYPERLIPIDEMIA: ICD-10-CM

## 2022-11-09 DIAGNOSIS — L97.519 DIABETIC ULCER OF RIGHT GREAT TOE (HCC): ICD-10-CM

## 2022-11-09 DIAGNOSIS — Z78.9 HEPATITIS B VACCINATION STATUS UNKNOWN: ICD-10-CM

## 2022-11-09 DIAGNOSIS — M21.961 DEFORMITY OF BOTH FEET: ICD-10-CM

## 2022-11-09 DIAGNOSIS — G62.9 NEUROPATHY: ICD-10-CM

## 2022-11-09 DIAGNOSIS — Z87.891 FORMER HEAVY TOBACCO SMOKER: ICD-10-CM

## 2022-11-09 DIAGNOSIS — E11.65 TYPE 2 DIABETES MELLITUS WITH HYPERGLYCEMIA, WITHOUT LONG-TERM CURRENT USE OF INSULIN (HCC): Primary | ICD-10-CM

## 2022-11-09 DIAGNOSIS — E11.65 TYPE 2 DIABETES MELLITUS WITH HYPERGLYCEMIA, WITHOUT LONG-TERM CURRENT USE OF INSULIN (HCC): ICD-10-CM

## 2022-11-09 DIAGNOSIS — Z23 ENCOUNTER FOR IMMUNIZATION: ICD-10-CM

## 2022-11-09 DIAGNOSIS — M25.561 ACUTE PAIN OF RIGHT KNEE: ICD-10-CM

## 2022-11-09 DIAGNOSIS — G89.29 CHRONIC PAIN OF LEFT KNEE: ICD-10-CM

## 2022-11-09 PROCEDURE — 3044F HG A1C LEVEL LT 7.0%: CPT | Performed by: INTERNAL MEDICINE

## 2022-11-09 PROCEDURE — 90472 IMMUNIZATION ADMIN EACH ADD: CPT | Performed by: INTERNAL MEDICINE

## 2022-11-09 PROCEDURE — 90686 IIV4 VACC NO PRSV 0.5 ML IM: CPT | Performed by: INTERNAL MEDICINE

## 2022-11-09 PROCEDURE — 99214 OFFICE O/P EST MOD 30 MIN: CPT | Performed by: INTERNAL MEDICINE

## 2022-11-09 PROCEDURE — 3074F SYST BP LT 130 MM HG: CPT | Performed by: INTERNAL MEDICINE

## 2022-11-09 PROCEDURE — 90471 IMMUNIZATION ADMIN: CPT | Performed by: INTERNAL MEDICINE

## 2022-11-09 PROCEDURE — 90677 PCV20 VACCINE IM: CPT | Performed by: INTERNAL MEDICINE

## 2022-11-09 PROCEDURE — 3078F DIAST BP <80 MM HG: CPT | Performed by: INTERNAL MEDICINE

## 2022-11-09 RX ORDER — BLOOD-GLUCOSE METER
KIT MISCELLANEOUS
Qty: 100 STRIP | Refills: 3 | Status: SHIPPED | OUTPATIENT
Start: 2022-11-09

## 2022-11-09 NOTE — PROGRESS NOTES
Identified pt with two pt identifiers(name and ). Chief Complaint   Patient presents with    Physical    Labs     fasting    Referral Request     ortho for knees        Health Maintenance Due   Topic    Pneumococcal 0-64 years (1 - PCV)    Eye Exam Retinal or Dilated     Hepatitis B Vaccine (1 of 3 - Risk 3-dose series)    Colorectal Cancer Screening Combo     Shingrix Vaccine Age 50> (1 of 2)    Low dose CT lung screening     COVID-19 Vaccine (3 - Booster for Moderna series)    Flu Vaccine (1)       Wt Readings from Last 3 Encounters:   22 181 lb (82.1 kg)   22 177 lb (80.3 kg)   22 178 lb 12.7 oz (81.1 kg)     Temp Readings from Last 3 Encounters:   22 97.8 °F (36.6 °C) (Temporal)   22 97.9 °F (36.6 °C)   22 97.2 °F (36.2 °C)     BP Readings from Last 3 Encounters:   22 134/84   22 (!) 115/59   22 123/73     Pulse Readings from Last 3 Encounters:   22 72   22 75   22 76         Learning Assessment:  :     Learning Assessment 2022   PRIMARY LEARNER Patient   HIGHEST LEVEL OF EDUCATION - PRIMARY LEARNER  GRADUATED HIGH SCHOOL OR GED   BARRIERS PRIMARY LEARNER NONE   CO-LEARNER CAREGIVER No   PRIMARY LANGUAGE ENGLISH    NEED No   LEARNER PREFERENCE PRIMARY DEMONSTRATION     LISTENING     READING     PICTURES   ANSWERED BY Patient   RELATIONSHIP SELF       Depression Screening:  :     3 most recent PHQ Screens 2022   Little interest or pleasure in doing things Not at all   Feeling down, depressed, irritable, or hopeless Not at all   Total Score PHQ 2 0       Fall Risk Assessment:  :     No flowsheet data found. Abuse Screening:  :     Abuse Screening Questionnaire 2022 2022 8/10/2022 2022 2022   Do you ever feel afraid of your partner? N N N N N   Are you in a relationship with someone who physically or mentally threatens you? N N N N N   Is it safe for you to go home?  Vangie Scott of Care Questionnaire:  :     1) Have you been to an emergency room, urgent care clinic since your last visit? no   Hospitalized since your last visit? no             2) Have you seen or consulted any other health care providers outside of [address] [place] [address] since your last visit? no  (Include any pap smears or colon screenings in this section.)    3) Do you have an Advance Directive on file? no  Are you interested in receiving information about Advance Directives? no    Patient is accompanied by N/A I have received verbal consent from Brenna Restrepo to discuss any/all medical information while they are present in the room. 4.  For patients aged 39-70: Has the patient had a colonoscopy / FIT/ Cologuard? No      If the patient is female:    5. For patients aged 41-77: Has the patient had a mammogram within the past 2 years? Yes - no Care Gap present      6. For patients aged 21-65: Has the patient had a pap smear?  Yes - no Care Gap present

## 2022-11-09 NOTE — PROGRESS NOTES
Chief Complaint   Patient presents with    Physical    Labs     fasting    Referral Request     ortho for knees       ASSESSMENT:  Diabetes Mellitus: stable    Diagnoses and all orders for this visit:    1. Type 2 diabetes mellitus with hyperglycemia, without long-term current use of insulin (HCC)  -     HEMOGLOBIN A1C WITH EAG; Future  -     glucose blood VI test strips (FreeStyle Lite Strips) strip; To check blood sugars 1-2 x daily. E11.9  -     REFERRAL TO PODIATRY    2. Acute pain of right knee  -     REFERRAL TO ORTHOPEDICS    3. [facility]   Previously done on 9/30/2022. See that note for details. 4. Diabetic ulcer of right great toe (HCC)  -     REFERRAL TO PODIATRY    5. Neuropathy  -     REFERRAL TO PODIATRY    6. Deformity of both feet  -     REFERRAL TO PODIATRY    7. Encounter for immunization  -     INFLUENZA, FLUARIX, FLULAVAL, FLUZONE (AGE 6 MO+), AFLURIA(AGE 3Y+) IM, PF, 0.5 ML  -     PNEUMOCOCCAL, PCV20, PREVNAR 20, (AGE 18 YRS+), IM, PF    8. Mixed hyperlipidemia  -     METABOLIC PANEL, COMPREHENSIVE; Future  -     CBC WITH AUTOMATED DIFF; Future  -     LIPID PANEL; Future    9. Vitamin D deficiency  -     VITAMIN D, 25 HYDROXY; Future    10. Former heavy tobacco smoker  -     CT LOW DOSE LUNG CANCER SCREENING; Future    11. Hepatitis B vaccination status unknown  -     HEP B SURFACE AB; Future    12. Encounter for screening mammogram for malignant neoplasm of breast  -     CRISTINA MAMMO BI SCREENING INCL CAD; Future    13. Screening for colon cancer  -     REFERRAL TO GASTROENTEROLOGY    14. Chronic pain of left knee  -     REFERRAL TO ORTHOPEDICS    Diabetic Goals:  HgA1C <7,  LDL cholesterol <100, Blood pressure <140/80. The patient is asked to make an attempt to improve diet and exercise patterns to aid in medical management of this problem. Potential long term end organ damage is discussed as well. I also recommend yearly eye exams and daily foot care. PLAN:  See orders for this visit as documented in the electronic medical record. Issues reviewed with her: low cholesterol diet, weight control and daily exercise discussed, home glucose monitoring emphasized, foot care discussed and Podiatry visits discussed, annual eye examinations at Ophthalmology discussed, glycohemoglobin and other lab monitoring discussed, and long term diabetic complications discussed. I have discussed the diagnosis with the patient and the intended treatment plan as seen in the above orders. The patient has received an after-visit summary and questions were answered concerning future plans. Asked to return should symptoms worsen or not improve with treatment. Any pending labs and studies will be relayed to patient when they become available. Pt verbalizes understanding of plan of care and denies further questions or concerns at this time. Follow-up and Dispositions    Return in about 3 months (around 2/9/2023), or if symptoms worsen or fail to improve. SUBJECTIVE:  61 y.o. female for follow up of diabetes. Diabetic Review of Systems - medication compliance: compliant most of the time, diabetic diet compliance: compliant most of the time, home glucose monitoring: is performed sporadically. Other symptoms and concerns: She has chronic foot and feet problems. Previously addressed on 9/30/2022. I have sent an order for diabetic foot shoes as recommended. .    Diabetic Report Card  Diabetic Goals:  HgA1C <7,  LDL cholesterol <100, Blood pressure <140/80.       Lab Results   Component Value Date/Time    Hemoglobin A1c 6.6 (H) 04/18/2022 08:39 AM     Lab Results   Component Value Date/Time    Cholesterol, total 197 04/18/2022 08:39 AM    HDL Cholesterol 74 04/18/2022 08:39 AM    LDL, calculated 96 04/18/2022 08:39 AM    VLDL, calculated 27 04/18/2022 08:39 AM    Triglyceride 135 04/18/2022 08:39 AM    CHOL/HDL Ratio 2.7 04/18/2022 08:39 AM     Lab Results   Component Value Date/Time    Microalbumin/Creat ratio (mg/g creat) <8 04/18/2022 08:39 AM    Microalbumin,urine random <0.50 04/18/2022 08:39 AM       I also recommended yearly eye exams and daily foot care. Current Outpatient Medications   Medication Sig Dispense Refill    Jardiance 10 mg tablet TAKE 1 TABLET DAILY 90 Tablet 3    tiZANidine (ZANAFLEX) 4 mg tablet Take 4 mg by mouth nightly. oxyCODONE ER (OxyCONTIN) 40 mg ER tablet Take 40 mg by mouth every twelve (12) hours. cyclobenzaprine (FLEXERIL) 10 mg tablet TAKE 1 TABLET BY MOUTH AT BEDTIME AS NEEDED FOR MUSCLE CRAMPS      estradioL (ESTRACE) 2 mg tablet 1 Tablet daily. gabapentin (NEURONTIN) 100 mg capsule Take 400 mg by mouth nightly. levothyroxine (SYNTHROID) 75 mcg tablet 1 Tablet daily. lidocaine (LIDODERM) 5 % 3 Patches by TransDERmal route daily. lisinopriL (PRINIVIL, ZESTRIL) 10 mg tablet 1 Tablet daily. metFORMIN (GLUCOPHAGE) 500 mg tablet Take 1,000 mg by mouth two (2) times a day. naproxen (NAPROSYN) 250 mg tablet Take 250 mg by mouth two (2) times daily as needed. ondansetron hcl (ZOFRAN) 4 mg tablet Take 4 mg by mouth three (3) times daily as needed. oxyCODONE IR (ROXICODONE) 5 mg immediate release tablet Take 10 mg by mouth four (4) times daily. polyethylene glycol (MIRALAX) 17 gram/dose powder Take 17 g by mouth nightly. vitamin C-multivitamin-mineral 500 mg chew Take  by mouth daily. OBJECTIVE:  Appearance: alert, well appearing, and in no distress. Visit Vitals  /70 (BP 1 Location: Right arm, BP Patient Position: Sitting, BP Cuff Size: Adult)   Pulse 84   Temp 97.1 °F (36.2 °C) (Temporal)   Resp 16   Ht 5' 6\" (1.676 m)   Wt 178 lb (80.7 kg)   SpO2 97%   BMI 28.73 kg/m²       General appearance: alert, well appearing, and in no distress. Chest: clear to auscultation, no wheezes, rales or rhonchi, symmetric air entry.   CVS exam: normal rate, regular rhythm, normal S1, S2, no murmurs, rubs, clicks or gallops. Abdominal exam: soft, nontender, nondistended, no masses or organomegaly. Exam of extremities: peripheral pulses normal, no pedal edema, no clubbing or cyanosis  Skin exam - normal coloration and turgor, no rashes, no suspicious skin lesions noted. Neurological exam reveals alert, oriented, normal speech, no focal findings or movement disorder noted.     Doc Dupont MD  [facility]  11/09/22

## 2022-11-10 LAB
25(OH)D3 SERPL-MCNC: 47.6 NG/ML (ref 30–100)
ALBUMIN SERPL-MCNC: 3.6 G/DL (ref 3.5–5)
ALBUMIN/GLOB SERPL: 0.9 {RATIO} (ref 1.1–2.2)
ALP SERPL-CCNC: 90 U/L (ref 45–117)
ALT SERPL-CCNC: 30 U/L (ref 12–78)
ANION GAP SERPL CALC-SCNC: 7 MMOL/L (ref 5–15)
AST SERPL-CCNC: 14 U/L (ref 15–37)
BASOPHILS # BLD: 0 K/UL (ref 0–0.1)
BASOPHILS NFR BLD: 1 % (ref 0–1)
BILIRUB SERPL-MCNC: 0.6 MG/DL (ref 0.2–1)
BUN SERPL-MCNC: 15 MG/DL (ref 6–20)
BUN/CREAT SERPL: 23 (ref 12–20)
CALCIUM SERPL-MCNC: 8.9 MG/DL (ref 8.5–10.1)
CHLORIDE SERPL-SCNC: 103 MMOL/L (ref 97–108)
CHOLEST SERPL-MCNC: 188 MG/DL
CO2 SERPL-SCNC: 27 MMOL/L (ref 21–32)
CREAT SERPL-MCNC: 0.64 MG/DL (ref 0.55–1.02)
DIFFERENTIAL METHOD BLD: NORMAL
EOSINOPHIL # BLD: 0.1 K/UL (ref 0–0.4)
EOSINOPHIL NFR BLD: 1 % (ref 0–7)
ERYTHROCYTE [DISTWIDTH] IN BLOOD BY AUTOMATED COUNT: 12.4 % (ref 11.5–14.5)
EST. AVERAGE GLUCOSE BLD GHB EST-MCNC: 163 MG/DL
GLOBULIN SER CALC-MCNC: 3.8 G/DL (ref 2–4)
GLUCOSE SERPL-MCNC: 157 MG/DL (ref 65–100)
HBA1C MFR BLD: 7.3 % (ref 4–5.6)
HBV SURFACE AB SER QL: NONREACTIVE
HBV SURFACE AB SER-ACNC: <3.1 MIU/ML
HCT VFR BLD AUTO: 45.4 % (ref 35–47)
HDLC SERPL-MCNC: 56 MG/DL
HDLC SERPL: 3.4 {RATIO} (ref 0–5)
HGB BLD-MCNC: 15.1 G/DL (ref 11.5–16)
IMM GRANULOCYTES # BLD AUTO: 0 K/UL (ref 0–0.04)
IMM GRANULOCYTES NFR BLD AUTO: 0 % (ref 0–0.5)
LDLC SERPL CALC-MCNC: 97.4 MG/DL (ref 0–100)
LYMPHOCYTES # BLD: 2 K/UL (ref 0.8–3.5)
LYMPHOCYTES NFR BLD: 26 % (ref 12–49)
MCH RBC QN AUTO: 32.2 PG (ref 26–34)
MCHC RBC AUTO-ENTMCNC: 33.3 G/DL (ref 30–36.5)
MCV RBC AUTO: 96.8 FL (ref 80–99)
MONOCYTES # BLD: 0.4 K/UL (ref 0–1)
MONOCYTES NFR BLD: 5 % (ref 5–13)
NEUTS SEG # BLD: 5.2 K/UL (ref 1.8–8)
NEUTS SEG NFR BLD: 67 % (ref 32–75)
NRBC # BLD: 0 K/UL (ref 0–0.01)
NRBC BLD-RTO: 0 PER 100 WBC
PLATELET # BLD AUTO: 326 K/UL (ref 150–400)
PMV BLD AUTO: 9.5 FL (ref 8.9–12.9)
POTASSIUM SERPL-SCNC: 4.5 MMOL/L (ref 3.5–5.1)
PROT SERPL-MCNC: 7.4 G/DL (ref 6.4–8.2)
RBC # BLD AUTO: 4.69 M/UL (ref 3.8–5.2)
SODIUM SERPL-SCNC: 137 MMOL/L (ref 136–145)
TRIGL SERPL-MCNC: 173 MG/DL (ref ?–150)
VLDLC SERPL CALC-MCNC: 34.6 MG/DL
WBC # BLD AUTO: 7.8 K/UL (ref 3.6–11)

## 2022-11-11 ENCOUNTER — TELEPHONE (OUTPATIENT)
Dept: FAMILY MEDICINE CLINIC | Age: 57
End: 2022-11-11

## 2022-11-11 NOTE — TELEPHONE ENCOUNTER
----- Message from Rekha Johnston MD sent at 11/11/2022  6:55 AM EST -----  Results reviewed and released in [employer] [facility] with recommendations. HOWEVER, just need to make sure she see's the recommendations. Thanks!

## 2022-11-11 NOTE — PROGRESS NOTES
Results reviewed and released in [employer] with recommendations. HOWEVER, just need to make sure she see's the recommendations. Thanks!

## 2022-11-16 ENCOUNTER — TELEPHONE (OUTPATIENT)
Dept: FAMILY MEDICINE CLINIC | Age: 57
End: 2022-11-16

## 2022-11-16 NOTE — TELEPHONE ENCOUNTER
----- Message from Monia Show sent at 11/16/2022  2:36 PM EST -----  Subject: Referral Request    Reason for referral request? patient states she still has not seen   anything or heard from [facility] referral for [facility] prosthetics . and   orthodontics , Center on [address] ,   phone [phone] , she knows paper work was done but   Provider patient wants to be referred to(if known):     Provider Phone Number(if known):     Additional Information for Provider?   ---------------------------------------------------------------------------  --------------  [address] [employer]    [phone]; OK to leave message on voicemail  ---------------------------------------------------------------------------  --------------

## 2022-11-17 NOTE — TELEPHONE ENCOUNTER
Called patient and she needs referral for [facility] for her diabetic shoes and orthotics. She is going to [facility] and [employer]. [address].

## 2022-11-18 NOTE — TELEPHONE ENCOUNTER
Called patient and relayed message. She understood and if she doesn't hear anything she will follow up with them.

## 2022-12-29 RX ORDER — METFORMIN HYDROCHLORIDE 500 MG/1
1000 TABLET ORAL 2 TIMES DAILY
Qty: 120 TABLET | Refills: 5 | Status: SHIPPED | OUTPATIENT
Start: 2022-12-29 | End: 2023-01-28

## 2022-12-29 RX ORDER — ESTRADIOL 2 MG/1
2 TABLET ORAL DAILY
Qty: 30 TABLET | Refills: 0 | Status: SHIPPED | OUTPATIENT
Start: 2022-12-29 | End: 2023-01-28

## 2022-12-29 RX ORDER — LEVOTHYROXINE SODIUM 75 UG/1
75 TABLET ORAL DAILY
Qty: 90 TABLET | Refills: 1 | Status: SHIPPED | OUTPATIENT
Start: 2022-12-29 | End: 2023-03-29

## 2022-12-29 RX ORDER — LISINOPRIL 10 MG/1
10 TABLET ORAL DAILY
Qty: 90 TABLET | Refills: 1 | Status: SHIPPED | OUTPATIENT
Start: 2022-12-29 | End: 2023-03-29

## 2023-01-11 ENCOUNTER — PATIENT MESSAGE (OUTPATIENT)
Dept: FAMILY MEDICINE CLINIC | Age: 58
End: 2023-01-11

## 2023-01-12 RX ORDER — ESTRADIOL 2 MG/1
2 TABLET ORAL DAILY
Qty: 90 TABLET | Refills: 1 | Status: SHIPPED | OUTPATIENT
Start: 2023-01-12 | End: 2023-02-11

## 2023-01-12 RX ORDER — METFORMIN HYDROCHLORIDE 500 MG/1
1000 TABLET ORAL 2 TIMES DAILY
Qty: 360 TABLET | Refills: 1 | Status: SHIPPED | OUTPATIENT
Start: 2023-01-12 | End: 2023-02-11

## 2023-05-26 ENCOUNTER — TELEPHONE (OUTPATIENT)
Age: 58
End: 2023-05-26

## 2023-05-26 DIAGNOSIS — G89.4 CHRONIC PAIN DISORDER: ICD-10-CM

## 2023-05-26 DIAGNOSIS — M21.961 UNSPECIFIED ACQUIRED DEFORMITY OF RIGHT LOWER LEG: Primary | ICD-10-CM

## 2023-05-29 ENCOUNTER — TELEPHONE (OUTPATIENT)
Age: 58
End: 2023-05-29

## 2023-05-29 DIAGNOSIS — E11.628 DIABETIC FOOT INFECTION (HCC): Primary | ICD-10-CM

## 2023-05-29 DIAGNOSIS — E11.628 DIABETIC FOOT INFECTION (HCC): ICD-10-CM

## 2023-05-29 DIAGNOSIS — L08.9 DIABETIC FOOT INFECTION (HCC): Primary | ICD-10-CM

## 2023-05-29 DIAGNOSIS — L08.9 DIABETIC FOOT INFECTION (HCC): ICD-10-CM

## 2023-05-29 RX ORDER — DOXYCYCLINE HYCLATE 100 MG
100 TABLET ORAL 2 TIMES DAILY
Qty: 20 TABLET | Refills: 0 | Status: SHIPPED | OUTPATIENT
Start: 2023-05-29 | End: 2023-06-08

## 2023-05-29 RX ORDER — DOXYCYCLINE HYCLATE 100 MG
100 TABLET ORAL 2 TIMES DAILY
Qty: 20 TABLET | Refills: 0 | Status: SHIPPED | OUTPATIENT
Start: 2023-05-29 | End: 2023-05-29 | Stop reason: SDUPTHER

## 2023-05-29 NOTE — TELEPHONE ENCOUNTER
ON CALL NOTE:    Diabetic pt noted redness and swelling of R big toe. She had similar infection last August which required antibiotics and treatment by Podiatry Dr. Nahid German. Will order DOXY and place referral to see Dr Nahid German.

## 2023-05-29 NOTE — TELEPHONE ENCOUNTER
ON CALL NOTE:  pt called to ask transfer DOXY script to [facility]age since [address] is closed.

## 2023-05-31 ENCOUNTER — TELEPHONE (OUTPATIENT)
Age: 58
End: 2023-05-31

## 2023-05-31 DIAGNOSIS — L08.9 DIABETIC FOOT INFECTION (HCC): Primary | ICD-10-CM

## 2023-05-31 DIAGNOSIS — E11.628 DIABETIC FOOT INFECTION (HCC): Primary | ICD-10-CM

## 2023-05-31 NOTE — TELEPHONE ENCOUNTER
Patient called [facility] line May 29th and spoke to Dr Nancy Garcia in regards to a swollen toe. Dr Nancy Garcia sent in an antibiotic and put in a referral to a podiatrist for Dr Dione Baird. Dr Dione Baird is no longer with the practice and she was wondering if Dr Nancy Garcia would put in a different referral for either  Dr. Alfonso Schwarz.  Stephan Vo DPM or Dr. Mitchell Sauer DPM    [facility] [address]  [address]  Phone: [phone]  Fax: [phone]          Patient Glory Cifuetnes [phone]

## 2023-06-28 ENCOUNTER — TELEPHONE (OUTPATIENT)
Age: 58
End: 2023-06-28

## 2023-08-08 ENCOUNTER — OFFICE VISIT (OUTPATIENT)
Age: 58
End: 2023-08-08
Payer: OTHER GOVERNMENT

## 2023-08-08 ENCOUNTER — NURSE ONLY (OUTPATIENT)
Age: 58
End: 2023-08-08
Payer: OTHER GOVERNMENT

## 2023-08-08 VITALS
BODY MASS INDEX: 27.8 KG/M2 | SYSTOLIC BLOOD PRESSURE: 122 MMHG | TEMPERATURE: 97.8 F | RESPIRATION RATE: 20 BRPM | DIASTOLIC BLOOD PRESSURE: 78 MMHG | HEIGHT: 66 IN | WEIGHT: 173 LBS | HEART RATE: 80 BPM | OXYGEN SATURATION: 99 %

## 2023-08-08 DIAGNOSIS — E03.9 ACQUIRED HYPOTHYROIDISM: ICD-10-CM

## 2023-08-08 DIAGNOSIS — B96.89 ACUTE BACTERIAL SINUSITIS: Primary | ICD-10-CM

## 2023-08-08 DIAGNOSIS — Z78.0 MENOPAUSE: ICD-10-CM

## 2023-08-08 DIAGNOSIS — E78.2 MIXED HYPERLIPIDEMIA: ICD-10-CM

## 2023-08-08 DIAGNOSIS — Z87.891 FORMER SMOKER: ICD-10-CM

## 2023-08-08 DIAGNOSIS — M54.42 CHRONIC BILATERAL LOW BACK PAIN WITH BILATERAL SCIATICA: ICD-10-CM

## 2023-08-08 DIAGNOSIS — F17.219 CIGARETTE NICOTINE DEPENDENCE WITH NICOTINE-INDUCED DISORDER: ICD-10-CM

## 2023-08-08 DIAGNOSIS — R30.0 DYSURIA: ICD-10-CM

## 2023-08-08 DIAGNOSIS — M54.41 CHRONIC BILATERAL LOW BACK PAIN WITH BILATERAL SCIATICA: ICD-10-CM

## 2023-08-08 DIAGNOSIS — E11.65 TYPE 2 DIABETES MELLITUS WITH HYPERGLYCEMIA, WITHOUT LONG-TERM CURRENT USE OF INSULIN (HCC): ICD-10-CM

## 2023-08-08 DIAGNOSIS — Z87.891 PERSONAL HISTORY OF TOBACCO USE: ICD-10-CM

## 2023-08-08 DIAGNOSIS — Z11.4 SCREENING FOR HIV (HUMAN IMMUNODEFICIENCY VIRUS): ICD-10-CM

## 2023-08-08 DIAGNOSIS — I10 PRIMARY HYPERTENSION: ICD-10-CM

## 2023-08-08 DIAGNOSIS — J01.90 ACUTE BACTERIAL SINUSITIS: Primary | ICD-10-CM

## 2023-08-08 DIAGNOSIS — G89.29 CHRONIC BILATERAL LOW BACK PAIN WITH BILATERAL SCIATICA: ICD-10-CM

## 2023-08-08 DIAGNOSIS — G47.01 INSOMNIA DUE TO MEDICAL CONDITION: ICD-10-CM

## 2023-08-08 LAB
BILIRUBIN, URINE, POC: NEGATIVE
BLOOD URINE, POC: NEGATIVE
GLUCOSE URINE, POC: NORMAL
KETONES, URINE, POC: NORMAL
LEUKOCYTE ESTERASE, URINE, POC: NEGATIVE
NITRITE, URINE, POC: NEGATIVE
PH, URINE, POC: 5 (ref 4.6–8)
PROTEIN,URINE, POC: NEGATIVE
SPECIFIC GRAVITY, URINE, POC: 1.01 (ref 1–1.03)
URINALYSIS CLARITY, POC: CLEAR
URINALYSIS COLOR, POC: YELLOW
UROBILINOGEN, POC: NORMAL

## 2023-08-08 PROCEDURE — G0296 VISIT TO DETERM LDCT ELIG: HCPCS | Performed by: INTERNAL MEDICINE

## 2023-08-08 PROCEDURE — 99214 OFFICE O/P EST MOD 30 MIN: CPT | Performed by: INTERNAL MEDICINE

## 2023-08-08 PROCEDURE — 81003 URINALYSIS AUTO W/O SCOPE: CPT | Performed by: INTERNAL MEDICINE

## 2023-08-08 PROCEDURE — 3078F DIAST BP <80 MM HG: CPT | Performed by: INTERNAL MEDICINE

## 2023-08-08 PROCEDURE — 3074F SYST BP LT 130 MM HG: CPT | Performed by: INTERNAL MEDICINE

## 2023-08-08 PROCEDURE — PBSHW AMB POC URINALYSIS DIP STICK AUTO W/O MICRO: Performed by: INTERNAL MEDICINE

## 2023-08-08 RX ORDER — AZITHROMYCIN 250 MG/1
250 TABLET, FILM COATED ORAL SEE ADMIN INSTRUCTIONS
Qty: 6 TABLET | Refills: 0 | Status: SHIPPED | OUTPATIENT
Start: 2023-08-08 | End: 2023-08-13

## 2023-08-08 RX ORDER — ESTRADIOL 2 MG/1
2 TABLET ORAL DAILY
COMMUNITY
End: 2023-08-08 | Stop reason: SDUPTHER

## 2023-08-08 RX ORDER — LEVOTHYROXINE SODIUM 0.03 MG/1
75 TABLET ORAL DAILY
Qty: 90 TABLET | Refills: 1 | Status: SHIPPED | OUTPATIENT
Start: 2023-08-08

## 2023-08-08 RX ORDER — ESTRADIOL 2 MG/1
2 TABLET ORAL DAILY
Qty: 90 TABLET | Refills: 1 | Status: SHIPPED | OUTPATIENT
Start: 2023-08-08

## 2023-08-08 RX ORDER — LISINOPRIL 10 MG/1
10 TABLET ORAL DAILY
Qty: 90 TABLET | Refills: 1 | Status: SHIPPED | OUTPATIENT
Start: 2023-08-08

## 2023-08-08 RX ORDER — TRAZODONE HYDROCHLORIDE 50 MG/1
50 TABLET ORAL NIGHTLY
Qty: 90 TABLET | Refills: 1 | Status: SHIPPED | OUTPATIENT
Start: 2023-08-08

## 2023-08-08 RX ORDER — LISINOPRIL 10 MG/1
TABLET ORAL
COMMUNITY
Start: 2022-02-19 | End: 2023-08-08 | Stop reason: SDUPTHER

## 2023-08-08 RX ORDER — LEVOTHYROXINE SODIUM 0.03 MG/1
75 TABLET ORAL DAILY
COMMUNITY
End: 2023-08-08 | Stop reason: SDUPTHER

## 2023-08-08 SDOH — ECONOMIC STABILITY: FOOD INSECURITY: WITHIN THE PAST 12 MONTHS, THE FOOD YOU BOUGHT JUST DIDN'T LAST AND YOU DIDN'T HAVE MONEY TO GET MORE.: NEVER TRUE

## 2023-08-08 SDOH — ECONOMIC STABILITY: HOUSING INSECURITY
IN THE LAST 12 MONTHS, WAS THERE A TIME WHEN YOU DID NOT HAVE A STEADY PLACE TO SLEEP OR SLEPT IN A SHELTER (INCLUDING NOW)?: NO

## 2023-08-08 SDOH — ECONOMIC STABILITY: INCOME INSECURITY: HOW HARD IS IT FOR YOU TO PAY FOR THE VERY BASICS LIKE FOOD, HOUSING, MEDICAL CARE, AND HEATING?: NOT HARD AT ALL

## 2023-08-08 SDOH — ECONOMIC STABILITY: FOOD INSECURITY: WITHIN THE PAST 12 MONTHS, YOU WORRIED THAT YOUR FOOD WOULD RUN OUT BEFORE YOU GOT MONEY TO BUY MORE.: NEVER TRUE

## 2023-08-08 ASSESSMENT — PATIENT HEALTH QUESTIONNAIRE - PHQ9
10. IF YOU CHECKED OFF ANY PROBLEMS, HOW DIFFICULT HAVE THESE PROBLEMS MADE IT FOR YOU TO DO YOUR WORK, TAKE CARE OF THINGS AT HOME, OR GET ALONG WITH OTHER PEOPLE: 0
9. THOUGHTS THAT YOU WOULD BE BETTER OFF DEAD, OR OF HURTING YOURSELF: 0
2. FEELING DOWN, DEPRESSED OR HOPELESS: 3
1. LITTLE INTEREST OR PLEASURE IN DOING THINGS: 3
4. FEELING TIRED OR HAVING LITTLE ENERGY: 3
5. POOR APPETITE OR OVEREATING: 0
SUM OF ALL RESPONSES TO PHQ QUESTIONS 1-9: 15
SUM OF ALL RESPONSES TO PHQ QUESTIONS 1-9: 15
7. TROUBLE CONCENTRATING ON THINGS, SUCH AS READING THE NEWSPAPER OR WATCHING TELEVISION: 3
3. TROUBLE FALLING OR STAYING ASLEEP: 3
SUM OF ALL RESPONSES TO PHQ9 QUESTIONS 1 & 2: 6
6. FEELING BAD ABOUT YOURSELF - OR THAT YOU ARE A FAILURE OR HAVE LET YOURSELF OR YOUR FAMILY DOWN: 0
SUM OF ALL RESPONSES TO PHQ QUESTIONS 1-9: 15
8. MOVING OR SPEAKING SO SLOWLY THAT OTHER PEOPLE COULD HAVE NOTICED. OR THE OPPOSITE, BEING SO FIGETY OR RESTLESS THAT YOU HAVE BEEN MOVING AROUND A LOT MORE THAN USUAL: 0
SUM OF ALL RESPONSES TO PHQ QUESTIONS 1-9: 15

## 2023-08-08 ASSESSMENT — ENCOUNTER SYMPTOMS
SINUS PAIN: 1
RESPIRATORY NEGATIVE: 1
ALLERGIC/IMMUNOLOGIC NEGATIVE: 1
SINUS PRESSURE: 1
EYES NEGATIVE: 1
BACK PAIN: 1
GASTROINTESTINAL NEGATIVE: 1

## 2023-08-08 NOTE — PROGRESS NOTES
Chief Complaint   Patient presents with    Diabetes    Medication Refill    Urinary Tract Infection     Back pain over a week       Assessment/ Plan:     1. Acute bacterial sinusitis  -     azithromycin (ZITHROMAX) 250 MG tablet; Take 1 tablet by mouth See Admin Instructions for 5 days 500mg on day 1 followed by 250mg on days 2 - 5, Disp-6 tablet, R-0Normal  2. Dysuria  -     AMB POC URINALYSIS DIP STICK AUTO W/O MICRO  3. Insomnia due to medical condition  -     traZODone (DESYREL) 50 MG tablet; Take 1 tablet by mouth nightly, Disp-90 tablet, R-1Normal  4. Chronic bilateral low back pain with bilateral sciatica   She is scheduled to see Dr. Desean Carrera,   5. Acquired hypothyroidism  -     levothyroxine (SYNTHROID) 25 MCG tablet; Take 3 tablets by mouth daily, Disp-90 tablet, R-1Normal  -     T4, Free; Future  -     TSH; Future  6. Primary hypertension  -     lisinopril (PRINIVIL;ZESTRIL) 10 MG tablet; Take 1 tablet by mouth daily, Disp-90 tablet, R-1Normal  7. Menopause  -     estradiol (ESTRACE) 2 MG tablet; Take 1 tablet by mouth daily, Disp-90 tablet, R-1Normal  8. Mixed hyperlipidemia  -     CBC with Auto Differential; Future  -     Comprehensive Metabolic Panel; Future  -     Lipid Panel; Future  9. Type 2 diabetes mellitus with hyperglycemia, without long-term current use of insulin (HCC)  -     Microalbumin / Creatinine Urine Ratio; Future  -     Hemoglobin A1C; Future  10. Former smoker  6. Cigarette nicotine dependence with nicotine-induced disorder  Discussed with the patient the current USPSTF guidelines released March 9, 2021 for screening for lung cancer. (Full dialogue discussed below)   12. Screening for HIV (human immunodeficiency virus)  -     HIV 1/2 Ag/Ab, 4TH Generation,W Rflx Confirm; Future  13. Personal history of tobacco use  -     OR VISIT TO DISCUSS LUNG CA SCREEN W LDCT  -     CT Lung Screen (Annual/Baseline); Future    58F with history of significant OA of the back and spine.  She is on
work, take care of things at home, or get along with other people? 0 - - - - -        Fall Risk Assessment:  :   No flowsheet data found. Abuse Screening:  :   No flowsheet data found. Coordination of Care Questionnaire:  :     1. \"Have you been to the ER, urgent care clinic since your last visit? Hospitalized since your last visit? \" no    2. \"Have you seen or consulted any other health care providers outside of the [address] [place] [address] since your last visit? \" no Dr Rory Mcdonald     3. This patient is accompanied in the office by her self. 4. For patients aged 43-73: Has the patient had a colonoscopy / FIT/ Cologuard? No      If the patient is female:    5. For patients aged 43-66: Has the patient had a mammogram within the past 2 years? No      6. For patients aged 21-65: Has the patient had a pap smear?  NA - based on age or sex
.

## 2023-08-09 LAB
BASOPHILS # BLD: 0.1 K/UL (ref 0–0.1)
BASOPHILS NFR BLD: 1 % (ref 0–1)
CREAT UR-MCNC: 58.7 MG/DL
DIFFERENTIAL METHOD BLD: NORMAL
EOSINOPHIL # BLD: 0.1 K/UL (ref 0–0.4)
EOSINOPHIL NFR BLD: 1 % (ref 0–7)
ERYTHROCYTE [DISTWIDTH] IN BLOOD BY AUTOMATED COUNT: 11.9 % (ref 11.5–14.5)
HCT VFR BLD AUTO: 46.4 % (ref 35–47)
HGB BLD-MCNC: 15 G/DL (ref 11.5–16)
HIV 1+2 AB+HIV1 P24 AG SERPL QL IA: NONREACTIVE
HIV 1/2 RESULT COMMENT: NORMAL
IMM GRANULOCYTES # BLD AUTO: 0 K/UL (ref 0–0.04)
IMM GRANULOCYTES NFR BLD AUTO: 0 % (ref 0–0.5)
LYMPHOCYTES # BLD: 2.3 K/UL (ref 0.8–3.5)
LYMPHOCYTES NFR BLD: 33 % (ref 12–49)
MCH RBC QN AUTO: 30.8 PG (ref 26–34)
MCHC RBC AUTO-ENTMCNC: 32.3 G/DL (ref 30–36.5)
MCV RBC AUTO: 95.3 FL (ref 80–99)
MICROALBUMIN UR-MCNC: 1.76 MG/DL
MICROALBUMIN/CREAT UR-RTO: 30 MG/G (ref 0–30)
MONOCYTES # BLD: 0.4 K/UL (ref 0–1)
MONOCYTES NFR BLD: 6 % (ref 5–13)
NEUTS SEG # BLD: 4.1 K/UL (ref 1.8–8)
NEUTS SEG NFR BLD: 59 % (ref 32–75)
NRBC # BLD: 0 K/UL (ref 0–0.01)
NRBC BLD-RTO: 0 PER 100 WBC
PLATELET # BLD AUTO: 291 K/UL (ref 150–400)
PMV BLD AUTO: 9.8 FL (ref 8.9–12.9)
RBC # BLD AUTO: 4.87 M/UL (ref 3.8–5.2)
WBC # BLD AUTO: 7 K/UL (ref 3.6–11)

## 2023-08-10 LAB
ALBUMIN SERPL-MCNC: 3.6 G/DL (ref 3.5–5)
ALBUMIN/GLOB SERPL: 0.8 (ref 1.1–2.2)
ALP SERPL-CCNC: 87 U/L (ref 45–117)
ALT SERPL-CCNC: 27 U/L (ref 12–78)
ANION GAP SERPL CALC-SCNC: 3 MMOL/L (ref 5–15)
AST SERPL-CCNC: 17 U/L (ref 15–37)
BILIRUB SERPL-MCNC: 0.3 MG/DL (ref 0.2–1)
BUN SERPL-MCNC: 13 MG/DL (ref 6–20)
BUN/CREAT SERPL: 16 (ref 12–20)
CALCIUM SERPL-MCNC: 9.4 MG/DL (ref 8.5–10.1)
CHLORIDE SERPL-SCNC: 99 MMOL/L (ref 97–108)
CHOLEST SERPL-MCNC: 188 MG/DL
CO2 SERPL-SCNC: 31 MMOL/L (ref 21–32)
CREAT SERPL-MCNC: 0.79 MG/DL (ref 0.55–1.02)
EST. AVERAGE GLUCOSE BLD GHB EST-MCNC: 163 MG/DL
GLOBULIN SER CALC-MCNC: 4.5 G/DL (ref 2–4)
GLUCOSE SERPL-MCNC: 153 MG/DL (ref 65–100)
HBA1C MFR BLD: 7.3 % (ref 4–5.6)
HDLC SERPL-MCNC: 58 MG/DL
HDLC SERPL: 3.2 (ref 0–5)
LDLC SERPL CALC-MCNC: 105.8 MG/DL (ref 0–100)
POTASSIUM SERPL-SCNC: 4.7 MMOL/L (ref 3.5–5.1)
PROT SERPL-MCNC: 8.1 G/DL (ref 6.4–8.2)
SODIUM SERPL-SCNC: 133 MMOL/L (ref 136–145)
T4 FREE SERPL-MCNC: 1.4 NG/DL (ref 0.8–1.5)
TRIGL SERPL-MCNC: 121 MG/DL
TSH SERPL DL<=0.05 MIU/L-ACNC: 0.46 UIU/ML (ref 0.36–3.74)
VLDLC SERPL CALC-MCNC: 24.2 MG/DL

## 2023-08-10 RX ORDER — LEVOTHYROXINE SODIUM 0.07 MG/1
TABLET ORAL
Qty: 90 TABLET | Refills: 3 | Status: SHIPPED | OUTPATIENT
Start: 2023-08-10 | End: 2023-08-11 | Stop reason: SDUPTHER

## 2023-08-11 RX ORDER — LEVOTHYROXINE SODIUM 0.07 MG/1
75 TABLET ORAL DAILY
Qty: 90 TABLET | Refills: 3 | Status: SHIPPED | OUTPATIENT
Start: 2023-08-11

## 2023-08-11 NOTE — TELEPHONE ENCOUNTER
Patient says [facility] never received the order for the levothyroxine 75 mg.  I did tell her its showing in our computer it was sent and confirmed but I would send a message back

## 2023-10-11 ENCOUNTER — TELEPHONE (OUTPATIENT)
Age: 58
End: 2023-10-11

## 2023-10-11 NOTE — TELEPHONE ENCOUNTER
Pt called pretty upset because she said she had labs done for HIV and was unaware and would like to speak with you as to why those labs were done

## 2023-11-06 RX ORDER — EMPAGLIFLOZIN 10 MG/1
10 TABLET, FILM COATED ORAL DAILY
Qty: 90 TABLET | Refills: 3 | Status: SHIPPED | OUTPATIENT
Start: 2023-11-06

## 2023-11-14 ENCOUNTER — OFFICE VISIT (OUTPATIENT)
Age: 58
End: 2023-11-14
Payer: OTHER GOVERNMENT

## 2023-11-14 VITALS
HEIGHT: 66 IN | HEART RATE: 88 BPM | TEMPERATURE: 97.9 F | DIASTOLIC BLOOD PRESSURE: 80 MMHG | RESPIRATION RATE: 16 BRPM | BODY MASS INDEX: 29.06 KG/M2 | SYSTOLIC BLOOD PRESSURE: 138 MMHG | OXYGEN SATURATION: 97 % | WEIGHT: 180.8 LBS

## 2023-11-14 DIAGNOSIS — J01.00 SUBACUTE MAXILLARY SINUSITIS: ICD-10-CM

## 2023-11-14 DIAGNOSIS — E11.65 TYPE 2 DIABETES MELLITUS WITH HYPERGLYCEMIA, WITHOUT LONG-TERM CURRENT USE OF INSULIN (HCC): Primary | ICD-10-CM

## 2023-11-14 PROCEDURE — 3075F SYST BP GE 130 - 139MM HG: CPT | Performed by: INTERNAL MEDICINE

## 2023-11-14 PROCEDURE — 3051F HG A1C>EQUAL 7.0%<8.0%: CPT | Performed by: INTERNAL MEDICINE

## 2023-11-14 PROCEDURE — 3079F DIAST BP 80-89 MM HG: CPT | Performed by: INTERNAL MEDICINE

## 2023-11-14 PROCEDURE — 99214 OFFICE O/P EST MOD 30 MIN: CPT | Performed by: INTERNAL MEDICINE

## 2023-11-14 RX ORDER — BACLOFEN 10 MG/1
20 TABLET ORAL
COMMUNITY
Start: 2023-11-09

## 2023-11-14 RX ORDER — AMOXICILLIN AND CLAVULANATE POTASSIUM 875; 125 MG/1; MG/1
1 TABLET, FILM COATED ORAL 2 TIMES DAILY
Qty: 20 TABLET | Refills: 0 | Status: SHIPPED | OUTPATIENT
Start: 2023-11-14 | End: 2023-11-14 | Stop reason: SDUPTHER

## 2023-11-14 RX ORDER — ALBUTEROL SULFATE 90 UG/1
2 AEROSOL, METERED RESPIRATORY (INHALATION) EVERY 6 HOURS PRN
COMMUNITY
Start: 2023-10-24

## 2023-11-14 RX ORDER — AMOXICILLIN AND CLAVULANATE POTASSIUM 875; 125 MG/1; MG/1
1 TABLET, FILM COATED ORAL 2 TIMES DAILY
Qty: 20 TABLET | Refills: 0 | Status: SHIPPED | OUTPATIENT
Start: 2023-11-14 | End: 2023-11-24

## 2023-11-14 RX ORDER — DULAGLUTIDE 0.75 MG/.5ML
0.75 INJECTION, SOLUTION SUBCUTANEOUS WEEKLY
Qty: 4 ADJUSTABLE DOSE PRE-FILLED PEN SYRINGE | Refills: 3 | Status: SHIPPED | OUTPATIENT
Start: 2023-11-14 | End: 2023-11-17 | Stop reason: SDUPTHER

## 2023-11-14 ASSESSMENT — PATIENT HEALTH QUESTIONNAIRE - PHQ9
SUM OF ALL RESPONSES TO PHQ QUESTIONS 1-9: 0
SUM OF ALL RESPONSES TO PHQ QUESTIONS 1-9: 0
1. LITTLE INTEREST OR PLEASURE IN DOING THINGS: 0
SUM OF ALL RESPONSES TO PHQ QUESTIONS 1-9: 0
SUM OF ALL RESPONSES TO PHQ9 QUESTIONS 1 & 2: 0
SUM OF ALL RESPONSES TO PHQ QUESTIONS 1-9: 0
2. FEELING DOWN, DEPRESSED OR HOPELESS: 0

## 2023-11-14 NOTE — PATIENT INSTRUCTIONS
SINUSITIS    1. Please take AUGMENTIN 1 tablet 2 x daily x 10 days. 2. Please use the FLONASE (nasal spray) 2 squirts in each nostril just 1 x per day. 3. Please use NORMAL SALINE nasal spray (buy this over the counter) use 2-3 squirts in each nostril every 1-2 hours while awake. This will help to really clear up and move the secretions down. Recommend AYR normal saline.

## 2023-11-14 NOTE — PROGRESS NOTES
Chief Complaint   Patient presents with    Follow-up    Diabetes    URI     Pt complains of having a sinus infection. She complains of green mucus coming from her nose. Assessment/ Plan:   Annie Marrero was seen today for follow-up, diabetes and uri. Diagnoses and all orders for this visit:    Type 2 diabetes mellitus with hyperglycemia, without long-term current use of insulin ([address])  -     Basic Metabolic Panel; Future  -     Hemoglobin A1C; Future  -     CBC; Future  -     Dulaglutide (TRULICITY) 7.27 WF/6.6TT SOPN; Inject 0.75 mg into the skin once a week    Subacute maxillary sinusitis  -     Discontinue: amoxicillin-clavulanate (AUGMENTIN) 875-125 MG per tablet; Take 1 tablet by mouth 2 times daily for 10 days  -     amoxicillin-clavulanate (AUGMENTIN) 875-125 MG per tablet; Take 1 tablet by mouth 2 times daily for 10 days  Patient Instructions   SINUSITIS    1. Please take AUGMENTIN 1 tablet 2 x daily x 10 days. 2. Please use the FLONASE (nasal spray) 2 squirts in each nostril just 1 x per day. 3. Please use NORMAL SALINE nasal spray (buy this over the counter) use 2-3 squirts in each nostril every 1-2 hours while awake. This will help to really clear up and move the secretions down. Recommend AYR normal saline. The patient was here for diabetes follow up, but had a maxillary sinusitis and treating this. She also discontinued her Metformin due to continuous GI problems with the med. We discussed starting Trulicity in addition to her Jardiance. Will treat the sinusitis as above and addressed all the other issues as well. I have discussed the diagnosis with the patient and the intended treatment plan as seen in the above orders. The patient has received an after-visit summary and questions were answered concerning future plans. Asked to return should symptoms worsen or not improve with treatment. Any pending labs and studies will be relayed to patient when they become available.      Pt

## 2023-11-17 DIAGNOSIS — E11.65 TYPE 2 DIABETES MELLITUS WITH HYPERGLYCEMIA, WITHOUT LONG-TERM CURRENT USE OF INSULIN (HCC): Primary | ICD-10-CM

## 2023-11-17 RX ORDER — DULAGLUTIDE 0.75 MG/.5ML
0.75 INJECTION, SOLUTION SUBCUTANEOUS WEEKLY
Qty: 4 ADJUSTABLE DOSE PRE-FILLED PEN SYRINGE | Refills: 5 | Status: SHIPPED | OUTPATIENT
Start: 2023-11-17

## 2023-11-17 NOTE — PROGRESS NOTES
No chief complaint on file.        Assessment/ Plan:   There are no diagnoses linked to this encounter.      I have discussed the diagnosis with the patient and the intended treatment plan as seen in the above orders. The patient has received an after-visit summary and questions were answered concerning future plans. Asked to return should symptoms worsen or not improve with treatment. Any pending labs and studies will be relayed to patient when they become available.     Pt verbalizes understanding of plan of care and denies further questions or concerns at this time.       Follow Up:  No follow-ups on file.        Subjective:   Kim Snell is a 58 y.o. female who presents     HISTORICAL  PMH, PSH, FHX, SOCHX, ALLERGIES and MES were reviewed and updated today.      Review of Systems  Review of Systems           Objective:   There were no vitals filed for this visit.    Physical Exam     Kim Delarosa MD  [facility]   11/17/23

## 2023-12-27 ENCOUNTER — TELEPHONE (OUTPATIENT)
Age: 58
End: 2023-12-27

## 2023-12-27 NOTE — TELEPHONE ENCOUNTER
Pt called with a few questions. She said that she needs her Trulicity filled because she tried to fill it but insurance only let her refill twice before saying that it needs to be changed to [facility] for 90 day refills instead of her getting it locally. She also said that she would like two referrals; one for her eye at [facility], and the other for her colonoscopy at [facility] in [place] because the last referral she had for that ran out last month before she was able to get it done so she wants a new one placed. She lastly asked that she had a pneumonia shot back in Nov. 2022 and she asked if that is something she needs to have done again and if it is yearly.

## 2023-12-28 DIAGNOSIS — E11.65 TYPE 2 DIABETES MELLITUS WITH HYPERGLYCEMIA, WITHOUT LONG-TERM CURRENT USE OF INSULIN (HCC): ICD-10-CM

## 2023-12-28 RX ORDER — DULAGLUTIDE 0.75 MG/.5ML
0.75 INJECTION, SOLUTION SUBCUTANEOUS WEEKLY
Qty: 12 ADJUSTABLE DOSE PRE-FILLED PEN SYRINGE | Refills: 1 | Status: SHIPPED | OUTPATIENT
Start: 2023-12-28 | End: 2024-03-27

## 2023-12-29 RX ORDER — BLOOD-GLUCOSE METER
KIT MISCELLANEOUS
Qty: 100 STRIP | Refills: 6 | Status: SHIPPED | OUTPATIENT
Start: 2023-12-29

## 2024-01-03 ENCOUNTER — NURSE ONLY (OUTPATIENT)
Age: 59
End: 2024-01-03

## 2024-01-03 DIAGNOSIS — E11.65 TYPE 2 DIABETES MELLITUS WITH HYPERGLYCEMIA, WITHOUT LONG-TERM CURRENT USE OF INSULIN (HCC): ICD-10-CM

## 2024-01-04 LAB
ANION GAP SERPL CALC-SCNC: 4 MMOL/L (ref 5–15)
BUN SERPL-MCNC: 17 MG/DL (ref 6–20)
BUN/CREAT SERPL: 24 (ref 12–20)
CALCIUM SERPL-MCNC: 9.1 MG/DL (ref 8.5–10.1)
CHLORIDE SERPL-SCNC: 102 MMOL/L (ref 97–108)
CO2 SERPL-SCNC: 29 MMOL/L (ref 21–32)
CREAT SERPL-MCNC: 0.7 MG/DL (ref 0.55–1.02)
ERYTHROCYTE [DISTWIDTH] IN BLOOD BY AUTOMATED COUNT: 12.1 % (ref 11.5–14.5)
EST. AVERAGE GLUCOSE BLD GHB EST-MCNC: 160 MG/DL
GLUCOSE SERPL-MCNC: 158 MG/DL (ref 65–100)
HBA1C MFR BLD: 7.2 % (ref 4–5.6)
HCT VFR BLD AUTO: 45.7 % (ref 35–47)
HGB BLD-MCNC: 15 G/DL (ref 11.5–16)
MCH RBC QN AUTO: 31.1 PG (ref 26–34)
MCHC RBC AUTO-ENTMCNC: 32.8 G/DL (ref 30–36.5)
MCV RBC AUTO: 94.8 FL (ref 80–99)
NRBC # BLD: 0 K/UL (ref 0–0.01)
NRBC BLD-RTO: 0 PER 100 WBC
PLATELET # BLD AUTO: 293 K/UL (ref 150–400)
PMV BLD AUTO: 9.2 FL (ref 8.9–12.9)
POTASSIUM SERPL-SCNC: 4.4 MMOL/L (ref 3.5–5.1)
RBC # BLD AUTO: 4.82 M/UL (ref 3.8–5.2)
SODIUM SERPL-SCNC: 135 MMOL/L (ref 136–145)
WBC # BLD AUTO: 6.1 K/UL (ref 3.6–11)

## 2024-01-05 ENCOUNTER — TELEPHONE (OUTPATIENT)
Age: 59
End: 2024-01-05

## 2024-01-05 NOTE — TELEPHONE ENCOUNTER
----- Message from Kim Delarosa MD sent at 1/4/2024  1:00 PM EST -----  Please let patient know that her labs are stable/improved.   HBA1C is slightly better than last 4-months.   Continue with medications as prescribed.   Follow up in 4-months.   Thanks!

## 2024-01-29 DIAGNOSIS — I10 PRIMARY HYPERTENSION: ICD-10-CM

## 2024-01-29 DIAGNOSIS — Z78.0 MENOPAUSE: ICD-10-CM

## 2024-01-29 RX ORDER — LISINOPRIL 10 MG/1
10 TABLET ORAL DAILY
Qty: 90 TABLET | Refills: 3 | Status: SHIPPED | OUTPATIENT
Start: 2024-01-29

## 2024-01-29 RX ORDER — ESTRADIOL 2 MG/1
2 TABLET ORAL DAILY
Qty: 90 TABLET | Refills: 3 | Status: SHIPPED | OUTPATIENT
Start: 2024-01-29

## 2024-02-14 ENCOUNTER — TELEPHONE (OUTPATIENT)
Age: 59
End: 2024-02-14

## 2024-02-14 DIAGNOSIS — Z12.11 SCREENING FOR COLON CANCER: Primary | ICD-10-CM

## 2024-02-14 DIAGNOSIS — H53.9 VISUAL DISTURBANCE: ICD-10-CM

## 2024-02-14 NOTE — TELEPHONE ENCOUNTER
Patient called for Referrals in December but I do not see them.    Ophthalmology [facility]- Diabetic Eye Exam   Dr Naresh Santana 2/28/24    Gastroenterology [place]  Igor Estrada  [phone]  No appt until referral is in place.  She is due for Colonoscopy     Once they are done please let me know so I can do the  Referrals and let her know. She was also asking about getting orders for a Mammogram and CT Lung Screening because she said she never had them done.     She was also asking about a referral for a vascular doctor. She said they had discussed that in her last appointment.    [phone]